# Patient Record
Sex: MALE | Race: WHITE | NOT HISPANIC OR LATINO | Employment: UNEMPLOYED | URBAN - METROPOLITAN AREA
[De-identification: names, ages, dates, MRNs, and addresses within clinical notes are randomized per-mention and may not be internally consistent; named-entity substitution may affect disease eponyms.]

---

## 2023-07-21 ENCOUNTER — OFFICE VISIT (OUTPATIENT)
Dept: PRIMARY CARE | Facility: CLINIC | Age: 43
End: 2023-07-21
Payer: COMMERCIAL

## 2023-07-21 VITALS
SYSTOLIC BLOOD PRESSURE: 130 MMHG | BODY MASS INDEX: 31.86 KG/M2 | DIASTOLIC BLOOD PRESSURE: 86 MMHG | RESPIRATION RATE: 16 BRPM | TEMPERATURE: 98.1 F | HEART RATE: 83 BPM | WEIGHT: 238.2 LBS | OXYGEN SATURATION: 98 %

## 2023-07-21 DIAGNOSIS — N13.8 BPH WITH OBSTRUCTION/LOWER URINARY TRACT SYMPTOMS: ICD-10-CM

## 2023-07-21 DIAGNOSIS — Z89.421 RIGHT TOE AMPUTEE (MULTI): ICD-10-CM

## 2023-07-21 DIAGNOSIS — E78.5 HYPERLIPIDEMIA, UNSPECIFIED HYPERLIPIDEMIA TYPE: ICD-10-CM

## 2023-07-21 DIAGNOSIS — G54.6 PHANTOM LIMB SYNDROME WITH PAIN (MULTI): ICD-10-CM

## 2023-07-21 DIAGNOSIS — R53.83 OTHER FATIGUE: ICD-10-CM

## 2023-07-21 DIAGNOSIS — E11.21 TYPE 2 DIABETES MELLITUS WITH DIABETIC NEPHROPATHY, WITHOUT LONG-TERM CURRENT USE OF INSULIN (MULTI): Primary | ICD-10-CM

## 2023-07-21 DIAGNOSIS — M14.671 CHARCOT'S JOINT OF RIGHT ANKLE: ICD-10-CM

## 2023-07-21 DIAGNOSIS — N40.1 BPH WITH OBSTRUCTION/LOWER URINARY TRACT SYMPTOMS: ICD-10-CM

## 2023-07-21 DIAGNOSIS — F32.5 MAJOR DEPRESSION IN REMISSION (CMS-HCC): ICD-10-CM

## 2023-07-21 PROBLEM — E66.9 OBESITY, CLASS I, BMI 30-34.9: Status: ACTIVE | Noted: 2023-07-21

## 2023-07-21 PROBLEM — E66.811 OBESITY, CLASS I, BMI 30-34.9: Status: ACTIVE | Noted: 2023-07-21

## 2023-07-21 PROBLEM — Z00.00 WELL ADULT EXAM: Status: ACTIVE | Noted: 2023-07-21

## 2023-07-21 PROBLEM — R33.9 URINARY RETENTION: Status: ACTIVE | Noted: 2023-07-21

## 2023-07-21 PROBLEM — N52.9 ED (ERECTILE DYSFUNCTION): Status: ACTIVE | Noted: 2023-07-21

## 2023-07-21 PROCEDURE — 99214 OFFICE O/P EST MOD 30 MIN: CPT | Performed by: FAMILY MEDICINE

## 2023-07-21 PROCEDURE — 3075F SYST BP GE 130 - 139MM HG: CPT | Performed by: FAMILY MEDICINE

## 2023-07-21 PROCEDURE — 82607 VITAMIN B-12: CPT

## 2023-07-21 PROCEDURE — 83036 HEMOGLOBIN GLYCOSYLATED A1C: CPT

## 2023-07-21 PROCEDURE — 82306 VITAMIN D 25 HYDROXY: CPT

## 2023-07-21 PROCEDURE — 85027 COMPLETE CBC AUTOMATED: CPT

## 2023-07-21 PROCEDURE — 80061 LIPID PANEL: CPT

## 2023-07-21 PROCEDURE — 84403 ASSAY OF TOTAL TESTOSTERONE: CPT

## 2023-07-21 PROCEDURE — 80053 COMPREHEN METABOLIC PANEL: CPT

## 2023-07-21 PROCEDURE — 3066F NEPHROPATHY DOC TX: CPT | Performed by: FAMILY MEDICINE

## 2023-07-21 PROCEDURE — 3079F DIAST BP 80-89 MM HG: CPT | Performed by: FAMILY MEDICINE

## 2023-07-21 PROCEDURE — 1036F TOBACCO NON-USER: CPT | Performed by: FAMILY MEDICINE

## 2023-07-21 RX ORDER — BLOOD-GLUCOSE METER
EACH MISCELLANEOUS
COMMUNITY
Start: 2020-05-15 | End: 2023-10-02 | Stop reason: ALTCHOICE

## 2023-07-21 RX ORDER — TADALAFIL 20 MG/1
20 TABLET ORAL DAILY
COMMUNITY
Start: 2021-10-13 | End: 2023-10-02 | Stop reason: ALTCHOICE

## 2023-07-21 NOTE — PROGRESS NOTES
Ivonne Kenny PA-C     "Subjective   Patient ID: Nicanor White is a 42 y.o. male who presents for 6 month follow up.  Recheck on chronic stable DM, BPH with obstruction, Fatigue still low -- hx of low testosterone.     Dating [place] National -- Lynn -- travelled to [place] twice in past year!      Objective   Visit Vitals  /86 (BP Location: Left arm, Patient Position: Sitting, BP Cuff Size: Adult)   Pulse 83   Temp 36.7 °C (98.1 °F) (Temporal)   Resp 16   Wt 108 kg (238 lb 3.2 oz)   SpO2 98%   BMI 31.86 kg/m²   Smoking Status Never   BSA 2.35 m²      O: VSS AFEB Awake, Alert, NAD.  No intoxication, withdrawal, or sedation.  Chest CTA.  Heart RRR.  Ext no c/c/e.      Lab Results   Component Value Date    WBC 9.3 07/22/2022    HGB 14.6 07/22/2022    HCT 47.3 07/22/2022    MCV 93 07/22/2022     07/22/2022       Chemistry    Lab Results   Component Value Date/Time     07/22/2022 1407    K 4.4 07/22/2022 1407     07/22/2022 1407    CO2 29 07/22/2022 1407    BUN 14 07/22/2022 1407    CREATININE 0.99 07/22/2022 1407    Lab Results   Component Value Date/Time    CALCIUM 9.3 07/22/2022 1407    ALKPHOS 152 (H) 07/22/2022 1407    AST 22 07/22/2022 1407    ALT 20 07/22/2022 1407    BILITOT 0.9 07/22/2022 1407          Lab Results   Component Value Date    CHOL 219 (H) 07/22/2022    CHOL 164 11/23/2020    CHOL 204 (H) 07/21/2020     Lab Results   Component Value Date    HDL 34.9 (A) 07/22/2022    HDL 42.8 11/23/2020    HDL 29.0 (A) 07/21/2020     No results found for: \"LDLCALC\"  Lab Results   Component Value Date    TRIG 159 (H) 11/23/2020    TRIG 125 07/21/2020    TRIG 87 03/31/2020     No components found for: \"CHOLHDL\"   Lab Results   Component Value Date    HGBA1C 5.1 07/22/2022       Assessment/Plan   Problem List Items Addressed This Visit       BPH with obstruction/lower urinary tract symptoms    Overview     did NOT tolerate flomax and tadalafil  Seeing URO Dr Reza in [place].         Charcot's joint of right ankle "    Overview     out of CROW boot.    Planning reconstructive surgery -- 8/15/23 -- DPM Dr. Lui.            Hyperlipidemia    Current Assessment & Plan     Monitor lipids.  Diet controlled.          Phantom limb syndrome with pain (CMS/HCC)    Overview     Cymbalta helped but made urine retention worse -- weaned off.          Right toe amputee (CMS/HCC)    Overview     Never needed to get fit for carbon fiber AFO.   Just getting shoe inserts.          Type 2 diabetes mellitus with diabetic nephropathy (CMS/HCC) - Primary    Overview     Type 2 Diabetes with nephropathy and neuropathy (dx 3/2020)  Excellent control A1c 5.7->5.4%   6/2021 -- off metformin, and glimepiride.          Current Assessment & Plan     diet controlled.  Monitor A1c.         Major depression in remission (CMS/HCC)    Overview      associated with anxiety triggered by acute illness    no worse since weaned off cymbalta.   Continue ongoing counseling.

## 2023-07-22 LAB
ALANINE AMINOTRANSFERASE (SGPT) (U/L) IN SER/PLAS: 38 U/L (ref 10–52)
ALBUMIN (G/DL) IN SER/PLAS: 4.8 G/DL (ref 3.4–5)
ALKALINE PHOSPHATASE (U/L) IN SER/PLAS: 119 U/L (ref 33–120)
ANION GAP IN SER/PLAS: 14 MMOL/L (ref 10–20)
ASPARTATE AMINOTRANSFERASE (SGOT) (U/L) IN SER/PLAS: 36 U/L (ref 9–39)
BILIRUBIN TOTAL (MG/DL) IN SER/PLAS: 1.1 MG/DL (ref 0–1.2)
CALCIDIOL (25 OH VITAMIN D3) (NG/ML) IN SER/PLAS: 40 NG/ML
CALCIUM (MG/DL) IN SER/PLAS: 10.3 MG/DL (ref 8.6–10.6)
CARBON DIOXIDE, TOTAL (MMOL/L) IN SER/PLAS: 31 MMOL/L (ref 21–32)
CHLORIDE (MMOL/L) IN SER/PLAS: 102 MMOL/L (ref 98–107)
CHOLESTEROL (MG/DL) IN SER/PLAS: 224 MG/DL (ref 0–199)
CHOLESTEROL IN HDL (MG/DL) IN SER/PLAS: 35.9 MG/DL
CHOLESTEROL/HDL RATIO: 6.2
COBALAMIN (VITAMIN B12) (PG/ML) IN SER/PLAS: 507 PG/ML (ref 211–911)
CREATININE (MG/DL) IN SER/PLAS: 0.79 MG/DL (ref 0.5–1.3)
ERYTHROCYTE DISTRIBUTION WIDTH (RATIO) BY AUTOMATED COUNT: 13.2 % (ref 11.5–14.5)
ERYTHROCYTE MEAN CORPUSCULAR HEMOGLOBIN CONCENTRATION (G/DL) BY AUTOMATED: 31.7 G/DL (ref 32–36)
ERYTHROCYTE MEAN CORPUSCULAR VOLUME (FL) BY AUTOMATED COUNT: 94 FL (ref 80–100)
ERYTHROCYTES (10*6/UL) IN BLOOD BY AUTOMATED COUNT: 5.45 X10E12/L (ref 4.5–5.9)
ESTIMATED AVERAGE GLUCOSE FOR HBA1C: 100 MG/DL
GFR MALE: >90 ML/MIN/1.73M2
GLUCOSE (MG/DL) IN SER/PLAS: 99 MG/DL (ref 74–99)
HEMATOCRIT (%) IN BLOOD BY AUTOMATED COUNT: 51.4 % (ref 41–52)
HEMOGLOBIN (G/DL) IN BLOOD: 16.3 G/DL (ref 13.5–17.5)
HEMOGLOBIN A1C/HEMOGLOBIN TOTAL IN BLOOD: 5.1 %
LDL: 146 MG/DL (ref 0–99)
LEUKOCYTES (10*3/UL) IN BLOOD BY AUTOMATED COUNT: 10.5 X10E9/L (ref 4.4–11.3)
NON HDL CHOLESTEROL: 188 MG/DL
NRBC (PER 100 WBCS) BY AUTOMATED COUNT: 0 /100 WBC (ref 0–0)
PLATELETS (10*3/UL) IN BLOOD AUTOMATED COUNT: 207 X10E9/L (ref 150–450)
POTASSIUM (MMOL/L) IN SER/PLAS: 4.8 MMOL/L (ref 3.5–5.3)
PROTEIN TOTAL: 7.6 G/DL (ref 6.4–8.2)
SODIUM (MMOL/L) IN SER/PLAS: 142 MMOL/L (ref 136–145)
TESTOSTERONE (NG/DL) IN SER/PLAS: 305 NG/DL (ref 240–1000)
TRIGLYCERIDE (MG/DL) IN SER/PLAS: 213 MG/DL (ref 0–149)
UREA NITROGEN (MG/DL) IN SER/PLAS: 13 MG/DL (ref 6–23)
VLDL: 43 MG/DL (ref 0–40)

## 2023-07-24 DIAGNOSIS — E78.5 HYPERLIPIDEMIA, UNSPECIFIED HYPERLIPIDEMIA TYPE: ICD-10-CM

## 2023-07-28 RX ORDER — ATORVASTATIN CALCIUM 40 MG/1
40 TABLET, FILM COATED ORAL DAILY
Qty: 30 TABLET | Refills: 11 | Status: SHIPPED | OUTPATIENT
Start: 2023-07-28 | End: 2023-10-02 | Stop reason: ALTCHOICE

## 2023-08-09 ENCOUNTER — HOSPITAL ENCOUNTER (OUTPATIENT)
Dept: DATA CONVERSION | Facility: HOSPITAL | Age: 43
Discharge: HOME | End: 2023-08-09

## 2023-08-09 DIAGNOSIS — M14.671 CHARCOT'S JOINT, RIGHT ANKLE AND FOOT: ICD-10-CM

## 2023-08-09 LAB
MRSA DNA SPEC QL NAA+PROBE: NEGATIVE
SPECIMEN SOURCE: NORMAL

## 2023-08-15 ENCOUNTER — HOSPITAL ENCOUNTER (OUTPATIENT)
Dept: DATA CONVERSION | Facility: HOSPITAL | Age: 43
Discharge: HOME | End: 2023-08-15

## 2023-08-15 DIAGNOSIS — Z89.431 ACQUIRED ABSENCE OF RIGHT FOOT (MULTI): ICD-10-CM

## 2023-08-15 DIAGNOSIS — M14.671 CHARCOT'S JOINT, RIGHT ANKLE AND FOOT: ICD-10-CM

## 2023-08-15 DIAGNOSIS — E11.610 TYPE 2 DIABETES MELLITUS WITH DIABETIC NEUROPATHIC ARTHROPATHY (MULTI): ICD-10-CM

## 2023-10-02 ENCOUNTER — APPOINTMENT (OUTPATIENT)
Dept: PREADMISSION TESTING | Facility: HOSPITAL | Age: 43
End: 2023-10-02
Payer: COMMERCIAL

## 2023-10-02 ENCOUNTER — ANESTHESIA EVENT (OUTPATIENT)
Dept: OPERATING ROOM | Facility: HOSPITAL | Age: 43
End: 2023-10-02
Payer: COMMERCIAL

## 2023-10-02 ENCOUNTER — PRE-ADMISSION TESTING (OUTPATIENT)
Dept: PREADMISSION TESTING | Facility: HOSPITAL | Age: 43
End: 2023-10-02
Payer: COMMERCIAL

## 2023-10-02 VITALS
HEIGHT: 73 IN | SYSTOLIC BLOOD PRESSURE: 133 MMHG | OXYGEN SATURATION: 100 % | DIASTOLIC BLOOD PRESSURE: 79 MMHG | BODY MASS INDEX: 32.47 KG/M2 | HEART RATE: 81 BPM | RESPIRATION RATE: 16 BRPM | WEIGHT: 245 LBS | TEMPERATURE: 96.6 F

## 2023-10-02 RX ORDER — ASPIRIN 81 MG/1
81 TABLET ORAL
COMMUNITY

## 2023-10-02 ASSESSMENT — ENCOUNTER SYMPTOMS
GASTROINTESTINAL NEGATIVE: 1
RESPIRATORY NEGATIVE: 1
EYES NEGATIVE: 1
CONSTITUTIONAL NEGATIVE: 1
NEUROLOGICAL NEGATIVE: 1
ARTHRALGIAS: 1
CARDIOVASCULAR NEGATIVE: 1

## 2023-10-02 ASSESSMENT — CHADS2 SCORE
AGE GREATER THAN OR EQUAL TO 75: NO
PRIOR STROKE OR TIA OR THROMBOEMBOLISM: NO
HYPERTENSION: NO
AGE GREATER THAN OR EQUAL TO 75: NO
CHADS2 SCORE: 1
DIABETES: YES
CHF: NO

## 2023-10-02 ASSESSMENT — PAIN - FUNCTIONAL ASSESSMENT: PAIN_FUNCTIONAL_ASSESSMENT: 0-10

## 2023-10-02 ASSESSMENT — PAIN DESCRIPTION - DESCRIPTORS: DESCRIPTORS: DISCOMFORT;DULL

## 2023-10-02 ASSESSMENT — PAIN SCALES - GENERAL: PAINLEVEL_OUTOF10: 3

## 2023-10-02 NOTE — PREPROCEDURE INSTRUCTIONS
PAT DISCHARGE INSTRUCTIONS    Please call the Same Day Surgery (SDS) Department of the hospital where your procedure will be performed after 2:00 PM the day before your surgery. If you are scheduled on a Monday, or a Tuesday following a Monday holiday, you will need to call on the last business day prior to your surgery.    [facility]  [id number] [place] [address], [address]  [phone]    [address] [address] [phone]    [facility]  [address]  [place] [id number]  [phone]    Please let your surgeon know if:      You develop any open sores, shingles, burning or painful urination as these may increase your risk of an infection.   You no longer wish to have the surgery.   Any other personal circumstances change that may lead to the need to cancel or defer this surgery-such as being sick or getting admitted to any hospital within one week of your planned procedure.    Your contact details change, such as a change of address or phone number.    Starting now:     Please DO NOT drink alcohol or smoke for 24 hours before surgery. It is well known that quitting smoking can make a huge difference to your health and recovery from surgery. The longer you abstain from smoking, the better your chances of a healthy recovery. If you need help with quitting, call 4-800QUIT-NOW to be connected to a trained counselor who will discuss the best methods to help you quit.     Before your surgery:    Please stop all supplements 7 days prior to surgery. Or as directed by your surgeon.   Please stop taking NSAID pain medicine such as Advil and Motrin 5 days before surgery.    If you develop any fever, cough, cold, rashes, cuts, scratches, scrapes, urinary symptoms or infection anywhere on your body (including teeth and gums) prior to surgery, please call your surgeon’s office as soon as possible. This may require treatment to reduce the chance of  cancellation on the day of surgery.    The day before your surgery:   DIET- Do not eat any solid food or drink anything after midnight the night before surgery. This includes gum, mints, hard candy and coffee.    Get a good night’s rest. Use the special soap for bathing if you have been instructed to use one.    Arrival time is typically 2 hours prior to the time of surgery.    Scheduled surgery times may change and you will be notified if this occurs - please check your personal voicemail for any updates.     On the morning of surgery:   Wear comfortable, loose fitting clothes which open in the front. Please do not wear moisturizers, creams, lotions, makeup or perfume.    Please bring with you to surgery:   Photo ID and insurance card   Current list of medicines and allergies   Pacemaker/ Defibrillator/Heart stent cards   CPAP machine and mask    Slings/ splints/ crutches   A copy of your complete advanced directive/DHPOA.    Please do NOT bring with you to surgery:   All jewelry and valuables should be left at home.   Prosthetic devices such as contact lenses, hearing aids, dentures, eyelash extensions, hairpins and body piercings must be removed prior to going in to the surgical suite.    After outpatient surgery:   A responsible adult MUST accompany you at the time of discharge and stay with you for 24 hours after your surgery. You may NOT drive yourself home after surgery.    Do not drive, operate machinery, make critical decisions or do activities that require co-ordination or balance until after a night’s sleep.   Do not drink alcoholic beverages for 24 hours.   Instructions for resuming your medications will be provided by your surgeon.    CALL YOUR DOCTOR AFTER SURGERY IF YOU HAVE:     Chills and/or a fever of 101° F or higher.    Redness, swelling, pus or drainage from your surgical wound or a bad smell from the wound.    Lightheadedness, fainting or confusion.    Persistent vomiting (throwing up) and are  not able to eat or drink for 12 hours.    Three or more loose, watery bowel movements in 24 hours (diarrhea).   Difficulty or pain while urinating( after non-urological surgery)    Pain and swelling in your legs, especially if it is only on one side.    Difficulty breathing or are breathing faster than normal.    Any new concerning symptoms.

## 2023-10-02 NOTE — CPM/PAT H&P
"CPM/PAT Evaluation       Name: Nicanor White (Nicanor White)  /Age: 1980/43 y.o.     In-Person       Chief Complaint: \"I'm having my hardware removed\"    HPI:    The patient is a 43 year old male with a history of Charcot foot.  In  he underwent right tibiotalocalcaneal surgery with application of external fixator.  He has done very well post operatively.  He has some discomfort surrounding the hardware, but no drainage or bleeding.  He has been followed by Dr. Ernst and removal of the external fixator is recommended at this time.      Past Medical History:   Diagnosis Date    Cellulitis of unspecified part of limb 05/15/2020    Cellulitis of foot    Other acute postprocedural pain 2020    Post-operative pain    Personal history of other infectious and parasitic diseases 2020    History of herpes zoster    Personal history of other specified conditions 2020    History of fatigue       Past Surgical History:   Procedure Laterality Date    EXTERNAL FIXATOR APPLICATION Right     lower extremity    OTHER SURGICAL HISTORY  2020    Toe amputation       Patient  reports being sexually active and has had partner(s) who are female.        Allergies   Allergen Reactions    Rondec (Brompheniramine) [Brompheniramine-Pseudoephedrin] Unknown       Prior to Admission medications    Medication Sig Start Date End Date Taking? Authorizing Provider   aspirin 81 mg EC tablet Take 1 tablet (81 mg) by mouth twice a day. PT STOPPED     Historical Provider, MD   atorvastatin (Lipitor) 40 mg tablet Take 1 tablet (40 mg) by mouth once daily. 7/28/23 10/2/23  Dylan Mckeon MD   blood sugar diagnostic (OneTouch Verio test strips) strip once daily. 5/15/20 10/2/23  Historical Provider, MD   tadalafil 20 mg tablet Take 1 tablet (20 mg) by mouth once daily. 10/13/21 10/2/23  Historical Provider, MD      Review of Systems   Constitutional: Negative.    HENT: Negative.     Eyes: Negative. " "   Respiratory: Negative.     Cardiovascular: Negative.    Gastrointestinal: Negative.    Genitourinary: Negative.    Musculoskeletal:  Positive for arthralgias.   Neurological: Negative.      Anesthesia complications:  post operative nausea and vomiting.    /79 (BP Location: Right arm, Patient Position: Sitting)   Pulse 81   Temp 35.9 °C (96.6 °F) (Temporal)   Resp 16   Ht 1.854 m (6' 1\")   Wt 111 kg (245 lb)   SpO2 100%   BMI 32.32 kg/m²       Physical Exam  Vitals reviewed.   Constitutional:       Appearance: Normal appearance.   HENT:      Head: Normocephalic and atraumatic.      Mouth/Throat:      Mouth: Mucous membranes are moist.      Pharynx: Oropharynx is clear.   Eyes:      Extraocular Movements: Extraocular movements intact.      Pupils: Pupils are equal, round, and reactive to light.      Comments: Glasses.   Cardiovascular:      Rate and Rhythm: Normal rate and regular rhythm.   Pulmonary:      Breath sounds: Normal breath sounds.   Abdominal:      General: Bowel sounds are normal.      Palpations: Abdomen is soft.   Musculoskeletal:      Comments: External fixator in place right lower extremity.   Skin:     General: Skin is warm and dry.   Neurological:      Mental Status: He is alert and oriented to person, place, and time.          Assessment and Plan:      Charot's joint right ankle and foot:  Right external fixator removal tibiotalocalcaneal arthrodesis right, arthrodesis medial column right, right foot harvest & applications bone marrow  Diabetes mellitus - diet controlled only  Revised Cardiac Risk Index:  0  CHADS2:  1      Mary Alatorre PA-C      "

## 2023-10-03 ENCOUNTER — APPOINTMENT (OUTPATIENT)
Dept: PREADMISSION TESTING | Facility: HOSPITAL | Age: 43
End: 2023-10-03
Payer: COMMERCIAL

## 2023-10-03 ENCOUNTER — APPOINTMENT (OUTPATIENT)
Dept: RADIOLOGY | Facility: HOSPITAL | Age: 43
End: 2023-10-03
Payer: COMMERCIAL

## 2023-10-03 ENCOUNTER — HOSPITAL ENCOUNTER (OUTPATIENT)
Facility: HOSPITAL | Age: 43
Setting detail: OUTPATIENT SURGERY
Discharge: HOME | End: 2023-10-03
Attending: PODIATRIST | Admitting: PODIATRIST
Payer: COMMERCIAL

## 2023-10-03 ENCOUNTER — PHARMACY VISIT (OUTPATIENT)
Dept: PHARMACY | Facility: CLINIC | Age: 43
End: 2023-10-03
Payer: MEDICAID

## 2023-10-03 ENCOUNTER — ANESTHESIA (OUTPATIENT)
Dept: OPERATING ROOM | Facility: HOSPITAL | Age: 43
End: 2023-10-03
Payer: COMMERCIAL

## 2023-10-03 VITALS
HEART RATE: 75 BPM | WEIGHT: 245 LBS | RESPIRATION RATE: 16 BRPM | BODY MASS INDEX: 32.47 KG/M2 | SYSTOLIC BLOOD PRESSURE: 123 MMHG | HEIGHT: 73 IN | OXYGEN SATURATION: 97 % | TEMPERATURE: 95.2 F | DIASTOLIC BLOOD PRESSURE: 67 MMHG

## 2023-10-03 DIAGNOSIS — Z01.818 PREOPERATIVE TESTING: Primary | ICD-10-CM

## 2023-10-03 DIAGNOSIS — M14.671 CHARCOT'S JOINT OF RIGHT ANKLE: ICD-10-CM

## 2023-10-03 PROCEDURE — 2720000007 HC OR 272 NO HCPCS: Performed by: PODIATRIST

## 2023-10-03 PROCEDURE — 2500000005 HC RX 250 GENERAL PHARMACY W/O HCPCS: Performed by: ANESTHESIOLOGIST ASSISTANT

## 2023-10-03 PROCEDURE — C1713 ANCHOR/SCREW BN/BN,TIS/BN: HCPCS | Performed by: PODIATRIST

## 2023-10-03 PROCEDURE — RXMED WILLOW AMBULATORY MEDICATION CHARGE

## 2023-10-03 PROCEDURE — 2780000003 HC OR 278 NO HCPCS: Performed by: PODIATRIST

## 2023-10-03 PROCEDURE — 7100000010 HC PHASE TWO TIME - EACH INCREMENTAL 1 MINUTE: Performed by: PODIATRIST

## 2023-10-03 PROCEDURE — 64445 NJX AA&/STRD SCIATIC NRV IMG: CPT | Performed by: ANESTHESIOLOGY

## 2023-10-03 PROCEDURE — 7100000002 HC RECOVERY ROOM TIME - EACH INCREMENTAL 1 MINUTE: Performed by: PODIATRIST

## 2023-10-03 PROCEDURE — 3700000002 HC GENERAL ANESTHESIA TIME - EACH INCREMENTAL 1 MINUTE: Performed by: PODIATRIST

## 2023-10-03 PROCEDURE — 76000 FLUOROSCOPY <1 HR PHYS/QHP: CPT

## 2023-10-03 PROCEDURE — 7100000001 HC RECOVERY ROOM TIME - INITIAL BASE CHARGE: Performed by: PODIATRIST

## 2023-10-03 PROCEDURE — 7100000009 HC PHASE TWO TIME - INITIAL BASE CHARGE: Performed by: PODIATRIST

## 2023-10-03 PROCEDURE — C1889 IMPLANT/INSERT DEVICE, NOC: HCPCS | Performed by: PODIATRIST

## 2023-10-03 PROCEDURE — A27870 PR ARTHRODESIS,ANKLE,OPEN: Performed by: ANESTHESIOLOGY

## 2023-10-03 PROCEDURE — A27870 PR ARTHRODESIS,ANKLE,OPEN: Performed by: ANESTHESIOLOGIST ASSISTANT

## 2023-10-03 PROCEDURE — 2580000001 HC RX 258 IV SOLUTIONS: Performed by: ANESTHESIOLOGY

## 2023-10-03 PROCEDURE — 2500000004 HC RX 250 GENERAL PHARMACY W/ HCPCS (ALT 636 FOR OP/ED): Performed by: ANESTHESIOLOGIST ASSISTANT

## 2023-10-03 PROCEDURE — 3600000009 HC OR TIME - EACH INCREMENTAL 1 MINUTE - PROCEDURE LEVEL FOUR: Performed by: PODIATRIST

## 2023-10-03 PROCEDURE — 3700000001 HC GENERAL ANESTHESIA TIME - INITIAL BASE CHARGE: Performed by: PODIATRIST

## 2023-10-03 PROCEDURE — 2500000004 HC RX 250 GENERAL PHARMACY W/ HCPCS (ALT 636 FOR OP/ED): Performed by: PODIATRIST

## 2023-10-03 PROCEDURE — 3600000004 HC OR TIME - INITIAL BASE CHARGE - PROCEDURE LEVEL FOUR: Performed by: PODIATRIST

## 2023-10-03 PROCEDURE — 2500000004 HC RX 250 GENERAL PHARMACY W/ HCPCS (ALT 636 FOR OP/ED): Performed by: ANESTHESIOLOGY

## 2023-10-03 DEVICE — GUIDE WIRE, BALL-TIPPED, STERILE: Type: IMPLANTABLE DEVICE | Site: HEEL | Status: FUNCTIONAL

## 2023-10-03 DEVICE — COMPRESSION SCREW CANNULATED
Type: IMPLANTABLE DEVICE | Site: FOOT | Status: FUNCTIONAL
Brand: T2

## 2023-10-03 DEVICE — LOCKING SCREW, PARTIALLY THREADED: Type: IMPLANTABLE DEVICE | Site: FOOT | Status: FUNCTIONAL

## 2023-10-03 DEVICE — IMPLANTABLE DEVICE: Type: IMPLANTABLE DEVICE | Site: HEEL | Status: FUNCTIONAL

## 2023-10-03 DEVICE — LOCKING SCREW, FULLY THREADED: Type: IMPLANTABLE DEVICE | Site: FOOT | Status: FUNCTIONAL

## 2023-10-03 RX ORDER — OXYCODONE HYDROCHLORIDE 5 MG/1
5 TABLET ORAL EVERY 6 HOURS PRN
Qty: 28 TABLET | Refills: 0 | Status: SHIPPED | OUTPATIENT
Start: 2023-10-03 | End: 2023-10-10

## 2023-10-03 RX ORDER — LIDOCAINE HYDROCHLORIDE 10 MG/ML
INJECTION INFILTRATION; PERINEURAL AS NEEDED
Status: DISCONTINUED | OUTPATIENT
Start: 2023-10-03 | End: 2023-10-03

## 2023-10-03 RX ORDER — PROPOFOL 10 MG/ML
INJECTION, EMULSION INTRAVENOUS AS NEEDED
Status: DISCONTINUED | OUTPATIENT
Start: 2023-10-03 | End: 2023-10-03

## 2023-10-03 RX ORDER — ONDANSETRON HYDROCHLORIDE 2 MG/ML
4 INJECTION, SOLUTION INTRAVENOUS ONCE AS NEEDED
Status: DISCONTINUED | OUTPATIENT
Start: 2023-10-03 | End: 2023-10-03 | Stop reason: HOSPADM

## 2023-10-03 RX ORDER — KETOROLAC TROMETHAMINE 30 MG/ML
INJECTION, SOLUTION INTRAMUSCULAR; INTRAVENOUS AS NEEDED
Status: DISCONTINUED | OUTPATIENT
Start: 2023-10-03 | End: 2023-10-03

## 2023-10-03 RX ORDER — SODIUM CHLORIDE, SODIUM LACTATE, POTASSIUM CHLORIDE, CALCIUM CHLORIDE 600; 310; 30; 20 MG/100ML; MG/100ML; MG/100ML; MG/100ML
50 INJECTION, SOLUTION INTRAVENOUS CONTINUOUS
Status: DISCONTINUED | OUTPATIENT
Start: 2023-10-03 | End: 2023-10-03 | Stop reason: HOSPADM

## 2023-10-03 RX ORDER — MIDAZOLAM HYDROCHLORIDE 1 MG/ML
2 INJECTION INTRAMUSCULAR; INTRAVENOUS ONCE
Status: COMPLETED | OUTPATIENT
Start: 2023-10-03 | End: 2023-10-03

## 2023-10-03 RX ORDER — SODIUM CHLORIDE 9 MG/ML
100 INJECTION, SOLUTION INTRAVENOUS CONTINUOUS
Status: DISCONTINUED | OUTPATIENT
Start: 2023-10-03 | End: 2023-10-03

## 2023-10-03 RX ORDER — ONDANSETRON HYDROCHLORIDE 2 MG/ML
INJECTION, SOLUTION INTRAVENOUS AS NEEDED
Status: DISCONTINUED | OUTPATIENT
Start: 2023-10-03 | End: 2023-10-03

## 2023-10-03 RX ORDER — CEFAZOLIN SODIUM 2 G/100ML
2 INJECTION, SOLUTION INTRAVENOUS ONCE
Status: COMPLETED | OUTPATIENT
Start: 2023-10-03 | End: 2023-10-03

## 2023-10-03 RX ORDER — SODIUM CHLORIDE, SODIUM LACTATE, POTASSIUM CHLORIDE, CALCIUM CHLORIDE 600; 310; 30; 20 MG/100ML; MG/100ML; MG/100ML; MG/100ML
100 INJECTION, SOLUTION INTRAVENOUS CONTINUOUS
Status: DISCONTINUED | OUTPATIENT
Start: 2023-10-03 | End: 2023-10-03 | Stop reason: HOSPADM

## 2023-10-03 RX ORDER — SCOLOPAMINE TRANSDERMAL SYSTEM 1 MG/1
1 PATCH, EXTENDED RELEASE TRANSDERMAL ONCE
Status: DISCONTINUED | OUTPATIENT
Start: 2023-10-03 | End: 2023-10-03 | Stop reason: HOSPADM

## 2023-10-03 RX ORDER — FENTANYL CITRATE 50 UG/ML
INJECTION, SOLUTION INTRAMUSCULAR; INTRAVENOUS AS NEEDED
Status: DISCONTINUED | OUTPATIENT
Start: 2023-10-03 | End: 2023-10-03

## 2023-10-03 RX ORDER — GLYCOPYRROLATE 0.2 MG/ML
INJECTION INTRAMUSCULAR; INTRAVENOUS AS NEEDED
Status: DISCONTINUED | OUTPATIENT
Start: 2023-10-03 | End: 2023-10-03

## 2023-10-03 RX ADMIN — ONDANSETRON HYDROCHLORIDE 4 MG: 2 INJECTION INTRAMUSCULAR; INTRAVENOUS at 08:38

## 2023-10-03 RX ADMIN — KETOROLAC TROMETHAMINE 30 MG: 30 INJECTION, SOLUTION INTRAMUSCULAR; INTRAVENOUS at 10:22

## 2023-10-03 RX ADMIN — FENTANYL CITRATE 100 MCG: 50 INJECTION, SOLUTION INTRAMUSCULAR; INTRAVENOUS at 07:55

## 2023-10-03 RX ADMIN — CEFAZOLIN SODIUM 2 G: 2 INJECTION, SOLUTION INTRAVENOUS at 08:20

## 2023-10-03 RX ADMIN — PROPOFOL 200 MG: 10 INJECTION, EMULSION INTRAVENOUS at 08:21

## 2023-10-03 RX ADMIN — FENTANYL CITRATE 50 MCG: 0.05 INJECTION, SOLUTION INTRAMUSCULAR; INTRAVENOUS at 10:25

## 2023-10-03 RX ADMIN — DEXAMETHASONE SODIUM PHOSPHATE 8 MG: 4 INJECTION, SOLUTION INTRAMUSCULAR; INTRAVENOUS at 08:38

## 2023-10-03 RX ADMIN — MIDAZOLAM HYDROCHLORIDE 2 MG: 1 INJECTION, SOLUTION INTRAMUSCULAR; INTRAVENOUS at 07:55

## 2023-10-03 RX ADMIN — LIDOCAINE HYDROCHLORIDE 5 ML: 10 INJECTION, SOLUTION INFILTRATION; PERINEURAL at 08:21

## 2023-10-03 RX ADMIN — SODIUM CHLORIDE, POTASSIUM CHLORIDE, SODIUM LACTATE AND CALCIUM CHLORIDE: 600; 310; 30; 20 INJECTION, SOLUTION INTRAVENOUS at 09:18

## 2023-10-03 RX ADMIN — GLYCOPYRROLATE 0.2 MG: 0.2 INJECTION INTRAMUSCULAR; INTRAVENOUS at 08:54

## 2023-10-03 RX ADMIN — SODIUM CHLORIDE, POTASSIUM CHLORIDE, SODIUM LACTATE AND CALCIUM CHLORIDE: 600; 310; 30; 20 INJECTION, SOLUTION INTRAVENOUS at 08:15

## 2023-10-03 RX ADMIN — SCOLOPAMINE TRANSDERMAL SYSTEM 1 PATCH: 1 PATCH, EXTENDED RELEASE TRANSDERMAL at 07:30

## 2023-10-03 ASSESSMENT — PAIN - FUNCTIONAL ASSESSMENT
PAIN_FUNCTIONAL_ASSESSMENT: FLACC (FACE, LEGS, ACTIVITY, CRY, CONSOLABILITY)
PAIN_FUNCTIONAL_ASSESSMENT: 0-10
PAIN_FUNCTIONAL_ASSESSMENT: FLACC (FACE, LEGS, ACTIVITY, CRY, CONSOLABILITY)
PAIN_FUNCTIONAL_ASSESSMENT: 0-10
PAIN_FUNCTIONAL_ASSESSMENT: FLACC (FACE, LEGS, ACTIVITY, CRY, CONSOLABILITY)
PAIN_FUNCTIONAL_ASSESSMENT: FLACC (FACE, LEGS, ACTIVITY, CRY, CONSOLABILITY)
PAIN_FUNCTIONAL_ASSESSMENT: 0-10
PAIN_FUNCTIONAL_ASSESSMENT: FLACC (FACE, LEGS, ACTIVITY, CRY, CONSOLABILITY)
PAIN_FUNCTIONAL_ASSESSMENT: 0-10
PAIN_FUNCTIONAL_ASSESSMENT: FLACC (FACE, LEGS, ACTIVITY, CRY, CONSOLABILITY)
PAIN_FUNCTIONAL_ASSESSMENT: 0-10
PAIN_FUNCTIONAL_ASSESSMENT: 0-10
PAIN_FUNCTIONAL_ASSESSMENT: FLACC (FACE, LEGS, ACTIVITY, CRY, CONSOLABILITY)
PAIN_FUNCTIONAL_ASSESSMENT: 0-10

## 2023-10-03 ASSESSMENT — PAIN SCALES - GENERAL

## 2023-10-03 ASSESSMENT — COLUMBIA-SUICIDE SEVERITY RATING SCALE - C-SSRS
1. IN THE PAST MONTH, HAVE YOU WISHED YOU WERE DEAD OR WISHED YOU COULD GO TO SLEEP AND NOT WAKE UP?: NO
6. HAVE YOU EVER DONE ANYTHING, STARTED TO DO ANYTHING, OR PREPARED TO DO ANYTHING TO END YOUR LIFE?: NO
2. HAVE YOU ACTUALLY HAD ANY THOUGHTS OF KILLING YOURSELF?: NO

## 2023-10-03 NOTE — PERIOPERATIVE NURSING NOTE
Bedside report given to ASHLY Schneider RN. Patient transferred to \[facility]\"" to prepare for discharge home.

## 2023-10-03 NOTE — POST-PROCEDURE NOTE
Patient sat up at the edge of the bed, denies dizziness. Family helping him get dressed. Nausea has subsided. Patient still denies pain. Dressing dry and intact.

## 2023-10-03 NOTE — ANESTHESIA PREPROCEDURE EVALUATION
Patient: Nicanor White    Procedure Information       Date/Time: 10/03/23 0715    Procedure: RIGHT EXTERNAL FIXATOR REMOVAL, TIBIOTALOCALCANEAL ARTHRODESIS RIGHT, ARTHRODESIS MEDIAL COLUMN RIGHT, RIGHT FOOT HARVEST & APPLICATION BONE MARROW AND PREPARATION (C-ARM, [employer] BMA/PRD/GRAFT-INFLUX, SAG SAW-ANDRES, LARGE CANNULATED SCREW, M NAIL - JERRY *PRE-OP BLOCK* (Right: Foot)    Location: [facility] / [facility]    Surgeons: Maliha Ernst DPM            Relevant Problems   Cardiovascular   (+) Hyperlipidemia      Endocrine   (+) Obesity, Class I, BMI 30-34.9   (+) Type 2 diabetes mellitus with diabetic nephropathy (CMS/HCC)      Neuro/Psych   (+) Major depression in remission (CMS/[facility])       Clinical information reviewed:   Tobacco  Allergies    Med Hx  Surg Hx   Fam Hx          NPO Detail:  No data recorded     Physical Exam    Airway  Mallampati: III  TM distance: >3 FB  Neck ROM: full     Cardiovascular    Dental    Pulmonary    Abdominal            Anesthesia Plan    ASA 3     general and other   (General with sciatic and saphenous (AC) nerve blocks.)

## 2023-10-03 NOTE — OP NOTE
RIGHT EXTERNAL FIXATOR REMOVAL, TIBIOTALOCALCANEAL ARTHRODESIS RIGHT, ARTHRODESIS MEDIAL COLUMN RIGHT, RIGHT FOOT HARVEST & APPLICATION BONE MARROW AND PREPARATION (C-ARM, [employer] BMA/PRD/GRAFT-INFLUX, SAG SAW-ANDRES, LARGE CANNULATED SCREW, M NAIL - JERRY *PRE-OP BLOCK* (R) Operative Note     Date: 10/3/2023  OR Location: [facility]    Name: Nicanor White, : 1980, Age: 43 y.o., MRN: [id number], Sex: male    Diagnosis  Pre-op Diagnosis     * Charcot's joint, right ankle and foot [M14.671] Post-op Diagnosis     * Charcot's joint, right ankle and foot [M14.671]     Procedures  Tibiotalar calcaneus arthrodesis with IM nail  Hindfoot beaming with talar  navicular arthrodesis  Autologous bone marrow harvest and application   External fixator removal    Surgeons      * Maliha Ernst - Primary    Resident/Fellow/Other Assistant:  No surgical staff documented.    Procedure Summary  Anesthesia: General  ASA: III  Anesthesia Staff: Anesthesiologist: Bubba Thomas MD  CRNA: Jazmine Randolph APRN-CRNA  C-AA: MUNDO Kingsley  Estimated Blood Loss: 20mL  Intra-op Medications:   Medication Name Total Dose   lactated Ringer's infusion 120.83 mL   scopolamine (Transderm-Scop) patch 1 patch 1 patch   ceFAZolin in dextrose (iso-os) (Ancef) IVPB 2 g 2 g   fentaNYL (Sublimaze) injection 100 mcg 100 mcg   midazolam (Versed) injection 2 mg 2 mg              Anesthesia Record               Intraprocedure I/O Totals          Intake    lactated Ringer's infusion 1200.00 mL    Autologous Blood 0 mL    Total Intake 1200 mL          Specimen: No specimens collected     Staff:   Circulator: Cecy Salmeron RN  Scrub Person: Yoli Olson         Drains and/or Catheters: * None in log *    Tourniquet Times:     89 minutes    Implants:  Implants       Type Name Action Serial No.       5MM X 115MM T2 LOCKING SCREW FULL THREAD Implanted      Screw LOCKING SCREW, T2 FULL THR 5MM X 35MM - [id number] Implanted       5MM X  32.5MM T2  LOCKING SCREW FULL THREAD Implanted       5MM X 50MM T2 SHAFT SCREW Implanted       5MM X 120MM T2 LOCKING SCREW FULL THREAD Implanted       5MM X 57.5MM T2  LOCKING SCREW FULL THREAD Implanted      Screw LOCKING SCREW, T2 FULL THR 5MM X 30MM - [id number] Implanted      Screw SCREW, ANKLE COMPRESSION, T2 - [id number] Implanted       12MM X 200MM, RIGHT, T2 ANKLE ARTHRODESIS NAIL Implanted      Screw GUIDE WIRE, RADHA, 3.0MM X 800MM - [id number] Implanted      Screw WIRE, NEEL 3 X 285 - [id number] Implanted      Screw WIRE, NEEL 3 X 285 - [id number] Implanted      Screw WIRE, NEEL 3 X 285 - [id number] Implanted       5.0MM X 70MM FIXOS HEADLESS COMPRESSION SCREW TITANIUM Implanted       3ML AUGMENT REGENERATIVE INJECTABLE Implanted       5CC INFLUX DEMINERALIZED CORTICAL BONE FIBER Implanted               Findings: see op report    Indications: Nicanor White is an 43 y.o. male who is having surgery for Charcot's joint, right ankle and foot [M14.671].  He underwent hindfoot reconstruction with application of an external fixator approximately 7 weeks ago.  This procedure his foot was relocated to be in line with the ankle and placed in a 90 degree position and the external fixator held in place.  He had shown good bone growth across his talotibial calcaneal interface.  Given that there is good bone growth the decision was made to remove the external fixator and do final fixation for his Charcot deformity.    The patient was seen in the preoperative area. The risks, benefits, complications, treatment options, non-operative alternatives, expected recovery and outcomes were discussed with the patient. The possibilities of reaction to medication, pulmonary aspiration, injury to surrounding structures, bleeding, recurrent infection, the need for additional procedures, failure to diagnose a condition, and creating a complication requiring transfusion or operation were discussed with the patient. The patient concurred  with the proposed plan, giving informed consent.  The site of surgery was properly noted/marked if necessary per policy. The patient has been actively warmed in preoperative area. Preoperative antibiotics have been ordered and given within 1 hours of incision. Venous thrombosis prophylaxis have been ordered including unilateral sequential compression device    Procedure Details: The patient was identified and the surgical site was marked, a preoperative regional block was performed.  The patient was then brought to the operating room and placed on the operating room table in the supine position.  Anesthesia then administered a general anesthesia.  The right lower extremity was then identified and a timeout was then performed properly identifying the patient and the surgical site and all other pertinent information.  After this a  was then used to release all the wires of the external fixator and the external fixator was then removed.  All wires were removed from with the patient's bone.  After this the right lower extremity was then scrubbed prepped and draped in the usual sterile fashion.   Attention was now directed to the anterior medial aspect of the tibial crest proximally where a Jamshidi trocar was then introduced and the bone marrow aspiration system was realized to aspirate 2 vials of bone marrow which were sent off the field to be spun down.  This incision was closed with 3-0 nylon in a simple interrupted fashion.  Once this was spun down it was added to 5 cc of influx DBM mixed with 3 cc of augment.    After this attention was then directed to the foot the foot was sitting in a rectus position fluoroscopy was then utilized to identify the foot was sitting in a 90 degree position with the second toe in line with the tibial tuberosity.  It was then made to begin with the intramedullary nail fixation.  The guidewire for the intramedullary nail was then introduced into the plantar aspect of the  calcaneus through the talar block into the tibia positioning was checked on fluoroscopy and was found to be in the central aspect of the calcaneus, talus and the tibial medullary canal in both the coronal and sagittal planes.  After this the appropriate reaming steps were then taken to be able to introduce a 12 mm 200 mm nail.  After all reaming was done the nail attached the jig was then inserted into the calcaneus through the tibia.  Its positioning was checked on fluoroscopy in multiple planes and was found to be in adequate position.  After this the nail was then fixated starting with the talar screw followed by the proximal tibial screws after this internal compression was then performed and approximately 3 mm of compression across the joint sites was then noted.  External compression was then applied as well.  After this the calcaneal screws were then implemented 1 first from lateral to medial.  Positioning of all the screws was then checked on fluoroscopy and found to be adequate after this the posterior to anterior screw was then inserted this 1 then across the calcaneocuboid joint as well.  At this point final radiographs were then taken of the intramedullary nail system in all positions were adequate as were the length of each of the screws.  The jig was then removed.    Now both the medial and lateral incision about the ankle joint and talonavicular joint were then made and carried down to the level of the bone there was a small deficit noted to the proximal aspect of the navicular face and the talar block interface.  Any fibrotic and nonviable tissue was then resected and sent off the field. this area was then copiously irrigated with normal saline.  Any bone graft with bone marrow aspirate and augment was then inserted feeling all deficits and voids.  After this a cannulated 5.0 partially-threaded screw was then placed from the medial aspect of the navicular across the talar block position and length  were checked to be adequate on fluoroscopy.  Another 5.0 solid fully threaded screw was then placed from the navicular through the talar block into the dorsal aspect of the calcaneus.  This was done in the standard AO fashion position and length were checked on fluoroscopy and found to be adequate.  Final radiographs were then taken in all hardware was intact, stable and in the appropriate position.  There is good apposition of all arthrodesis sites.  A layered closure was then performed consisting of 2-0, 3-0 vicryl and 3-0 and 2-0 nylon was performed in a simple interrupted and horizontal mattress type fashion.  A dressing of triple antibiotic ointment, Adaptic, gauze, Kerlix, ABD pads and a well-padded posterior splint was then applied.  The patient was then awoken and transferred to the postanesthesia care unit with all vital signs stable and neurovascular status intact as it was preoperatively.  It should be noted that all sponge needle instrument counts were deemed to be accurate at the end of the case.  Complications:  None; patient tolerated the procedure well.    Disposition: PACU - hemodynamically stable.  Condition: stable         Additional Details: none    Attending Attestation:     Maliha Ernst  Phone Number: [phone]

## 2023-10-03 NOTE — POST-PROCEDURE NOTE
Discharge instructions reviewed with patient and his family. All verbalize understanding. Patient has crutches and walker at home to help him get around. Meds from pharmacy received.

## 2023-10-03 NOTE — ANESTHESIA PROCEDURE NOTES
Peripheral Block    Patient location during procedure: post-op  Start time: 10/3/2023 7:57 AM  End time: 10/3/2023 8:07 AM  Reason for block: post-op pain management  Staffing  Performed: attending   Authorized by: Bubba Thomas MD    Performed by: Bubba Thomas MD  Preanesthetic Checklist  Completed: patient identified, IV checked, site marked, risks and benefits discussed, surgical consent, monitors and equipment checked, pre-op evaluation and timeout performed   Timeout performed at: 10/3/2023 7:54 AM  Peripheral Block  Patient position: laying flat  Prep: alcohol swabs, ChloraPrep and site prepped and draped  Patient monitoring: heart rate, cardiac monitor and continuous pulse ox  Block type: adductor canal and sciatic  Injection technique: single-shot  Guidance: nerve stimulator and ultrasound guided  Infiltration strength: 1 %  Dose: 3 mL  Needle  Needle gauge: 22 G  Needle length: 5 cm  Needle localization: ultrasound guidance     image stored in chart  Needle insertion depth: 5 cm  Test dose: negative  Assessment  Injection assessment: negative aspiration for heme, no paresthesia on injection, local visualized surrounding nerve on ultrasound and incremental injection  Paresthesia pain: immediately resolved  Heart rate change: no  Slow fractionated injection: no  Additional Notes  Meds used:  bupivacaine 0.5% x 20mL for sciatic nerve block PLUS bupivacaine 0.5% with dexamethasone 10mg x 20mL for AC block

## 2023-10-03 NOTE — ANESTHESIA PROCEDURE NOTES
Airway  Date/Time: 10/3/2023 8:24 AM  Urgency: elective    Airway not difficult    Staffing  Performed: CAA   Authorized by: Bubba Thomas MD    Performed by: MUNDO Kingsley  Patient location during procedure: OR    Indications and Patient Condition  Indications for airway management: anesthesia  Spontaneous ventilation: present  Sedation level: deep  Preoxygenated: yes  Patient position: sniffing  MILS not maintained throughout  Mask difficulty assessment: 0 - not attempted    Final Airway Details  Final airway type: supraglottic airway      Successful airway: classic  Size 5     Number of attempts at approach: 1

## 2023-10-03 NOTE — ANESTHESIA POSTPROCEDURE EVALUATION
Patient: Nicanor White    Procedure Summary       Date: 10/03/23 Room / Location: [facility] 02 / [facility]    Anesthesia Start: 0815 Anesthesia Stop: 1041    Procedure: RIGHT EXTERNAL FIXATOR REMOVAL, TIBIOTALOCALCANEAL ARTHRODESIS RIGHT, ARTHRODESIS MEDIAL COLUMN RIGHT, RIGHT FOOT HARVEST & APPLICATION BONE MARROW AND PREPARATION (C-ARM, [employer] BMA/PRD/GRAFT-INFLUX, SAG SAW-ANDRES, LARGE CANNULATED SCREW, M NAIL - JERRY *PRE-OP BLOCK* (Right: Foot) Diagnosis:       Charcot's joint, right ankle and foot      (Charcot's joint, right ankle and foot [M14.671])    Surgeons: Maliha Ernst DPM Responsible Provider: Bubba Thomas MD    Anesthesia Type: general, other ASA Status: 3            Anesthesia Type: general, other    Vitals Value Taken Time   /79 10/03/23 1140   Temp 36.1 °C (97 °F) 10/03/23 1140   Pulse 72 10/03/23 1140   Resp 13 10/03/23 1140   SpO2 96 % 10/03/23 1140       Anesthesia Post Evaluation    Patient location during evaluation: bedside  Patient participation: complete - patient participated  Level of consciousness: awake and alert  Pain management: satisfactory to patient  Multimodal analgesia pain management approach  Airway patency: patent  Cardiovascular status: acceptable and blood pressure returned to baseline  Respiratory status: acceptable  Hydration status: acceptable  Comments: Patient with some mild nausea post-op in phase 2.  At one point was about to get dressed but felt nauseated, and laid back down for approximately 30 min.  Felt better after time and asked to leave.        No notable events documented.

## 2023-10-03 NOTE — POST-PROCEDURE NOTE
Report received from RHONDA Hastings. Patient pulled to [facility], denies pain. Dressing to right lower extremity dry and intact. Patient given crackers and ginger ale, family called to room. Patient somewhat nauseated, will monitor.

## 2023-10-04 ENCOUNTER — APPOINTMENT (OUTPATIENT)
Dept: PREADMISSION TESTING | Facility: HOSPITAL | Age: 43
End: 2023-10-04
Payer: COMMERCIAL

## 2024-01-05 ENCOUNTER — LAB (OUTPATIENT)
Dept: LAB | Facility: LAB | Age: 44
End: 2024-01-05
Payer: COMMERCIAL

## 2024-01-05 ENCOUNTER — OFFICE VISIT (OUTPATIENT)
Dept: PRIMARY CARE | Facility: CLINIC | Age: 44
End: 2024-01-05
Payer: COMMERCIAL

## 2024-01-05 VITALS
TEMPERATURE: 97.6 F | DIASTOLIC BLOOD PRESSURE: 68 MMHG | RESPIRATION RATE: 16 BRPM | OXYGEN SATURATION: 98 % | HEART RATE: 86 BPM | BODY MASS INDEX: 32.59 KG/M2 | WEIGHT: 247 LBS | SYSTOLIC BLOOD PRESSURE: 110 MMHG

## 2024-01-05 DIAGNOSIS — E11.610 TYPE 2 DIABETES MELLITUS WITH DIABETIC NEUROPATHIC ARTHROPATHY, WITHOUT LONG-TERM CURRENT USE OF INSULIN (MULTI): ICD-10-CM

## 2024-01-05 DIAGNOSIS — M14.671 CHARCOT'S JOINT OF RIGHT ANKLE: ICD-10-CM

## 2024-01-05 DIAGNOSIS — G54.6 PHANTOM LIMB SYNDROME WITH PAIN (MULTI): ICD-10-CM

## 2024-01-05 DIAGNOSIS — E29.1 HYPOGONADISM MALE: ICD-10-CM

## 2024-01-05 DIAGNOSIS — Z79.899 DRUG THERAPY: ICD-10-CM

## 2024-01-05 DIAGNOSIS — F32.5 MAJOR DEPRESSION IN REMISSION (CMS-HCC): Primary | ICD-10-CM

## 2024-01-05 DIAGNOSIS — Z89.421 RIGHT TOE AMPUTEE (MULTI): ICD-10-CM

## 2024-01-05 DIAGNOSIS — Z12.5 SPECIAL SCREENING FOR MALIGNANT NEOPLASM OF PROSTATE: ICD-10-CM

## 2024-01-05 PROBLEM — E11.9 DIABETES MELLITUS (MULTI): Status: ACTIVE | Noted: 2024-01-05

## 2024-01-05 LAB
ALBUMIN SERPL BCP-MCNC: 4.4 G/DL (ref 3.4–5)
ALP SERPL-CCNC: 186 U/L (ref 33–120)
ALT SERPL W P-5'-P-CCNC: 32 U/L (ref 10–52)
ANION GAP SERPL CALC-SCNC: 15 MMOL/L (ref 10–20)
AST SERPL W P-5'-P-CCNC: 25 U/L (ref 9–39)
BILIRUB SERPL-MCNC: 1.2 MG/DL (ref 0–1.2)
BUN SERPL-MCNC: 14 MG/DL (ref 6–23)
CALCIUM SERPL-MCNC: 9.7 MG/DL (ref 8.6–10.3)
CHLORIDE SERPL-SCNC: 103 MMOL/L (ref 98–107)
CO2 SERPL-SCNC: 29 MMOL/L (ref 21–32)
CREAT SERPL-MCNC: 0.81 MG/DL (ref 0.5–1.3)
ERYTHROCYTE [DISTWIDTH] IN BLOOD BY AUTOMATED COUNT: 12.4 % (ref 11.5–14.5)
GFR SERPL CREATININE-BSD FRML MDRD: >90 ML/MIN/1.73M*2
GLUCOSE SERPL-MCNC: 84 MG/DL (ref 74–99)
HCT VFR BLD AUTO: 50.8 % (ref 41–52)
HGB BLD-MCNC: 15.9 G/DL (ref 13.5–17.5)
MCH RBC QN AUTO: 29.1 PG (ref 26–34)
MCHC RBC AUTO-ENTMCNC: 31.3 G/DL (ref 32–36)
MCV RBC AUTO: 93 FL (ref 80–100)
NRBC BLD-RTO: 0 /100 WBCS (ref 0–0)
PLATELET # BLD AUTO: 188 X10*3/UL (ref 150–450)
POTASSIUM SERPL-SCNC: 4.5 MMOL/L (ref 3.5–5.3)
PROT SERPL-MCNC: 7.2 G/DL (ref 6.4–8.2)
RBC # BLD AUTO: 5.46 X10*6/UL (ref 4.5–5.9)
SODIUM SERPL-SCNC: 142 MMOL/L (ref 136–145)
WBC # BLD AUTO: 9 X10*3/UL (ref 4.4–11.3)

## 2024-01-05 PROCEDURE — 3074F SYST BP LT 130 MM HG: CPT | Performed by: FAMILY MEDICINE

## 2024-01-05 PROCEDURE — 84403 ASSAY OF TOTAL TESTOSTERONE: CPT

## 2024-01-05 PROCEDURE — 80365 DRUG SCREENING OXYCODONE: CPT

## 2024-01-05 PROCEDURE — 83036 HEMOGLOBIN GLYCOSYLATED A1C: CPT

## 2024-01-05 PROCEDURE — 1036F TOBACCO NON-USER: CPT | Performed by: FAMILY MEDICINE

## 2024-01-05 PROCEDURE — 3078F DIAST BP <80 MM HG: CPT | Performed by: FAMILY MEDICINE

## 2024-01-05 PROCEDURE — 80354 DRUG SCREENING FENTANYL: CPT

## 2024-01-05 PROCEDURE — 85027 COMPLETE CBC AUTOMATED: CPT

## 2024-01-05 PROCEDURE — 80373 DRUG SCREENING TRAMADOL: CPT

## 2024-01-05 PROCEDURE — 99214 OFFICE O/P EST MOD 30 MIN: CPT | Performed by: FAMILY MEDICINE

## 2024-01-05 PROCEDURE — 84153 ASSAY OF PSA TOTAL: CPT

## 2024-01-05 PROCEDURE — 80368 SEDATIVE HYPNOTICS: CPT

## 2024-01-05 PROCEDURE — 3066F NEPHROPATHY DOC TX: CPT | Performed by: FAMILY MEDICINE

## 2024-01-05 PROCEDURE — 80361 OPIATES 1 OR MORE: CPT

## 2024-01-05 PROCEDURE — 80346 BENZODIAZEPINES1-12: CPT

## 2024-01-05 PROCEDURE — 36415 COLL VENOUS BLD VENIPUNCTURE: CPT

## 2024-01-05 PROCEDURE — 80358 DRUG SCREENING METHADONE: CPT

## 2024-01-05 PROCEDURE — 80053 COMPREHEN METABOLIC PANEL: CPT

## 2024-01-05 RX ORDER — TESTOSTERONE CYPIONATE 200 MG/ML
200 INJECTION, SOLUTION INTRAMUSCULAR
Qty: 3 ML | Refills: 1 | Status: SHIPPED | OUTPATIENT
Start: 2024-01-05 | End: 2024-01-24 | Stop reason: SDUPTHER

## 2024-01-05 NOTE — ASSESSMENT & PLAN NOTE
Recheck levels.  And monitor PSA/CBC.   Start 200 mg IM q4 weeks.  Recheck TEST/PSA/CBC in 3-4 months.

## 2024-01-05 NOTE — PROGRESS NOTES
Subjective   Patient ID: Nicanor White is a 43 y.o. male who presents for Follow-up (6 month).  Recheck on chronic stable DM, BPH with obstruction, Fatigue still low -- hx of low testosterone.     Dating [place] [employer] -- Lynn (Drew) -- travelled to [place] twice in past year!    S/P Right ankle/foot fusion summer 2023    Down another 10#  Continues to have low energy, ED, and low muscle mass despite rehabbing.     OARRS:  Dylan Mckeon MD on 1/5/2024  1:58 PM  I have personally reviewed the OARRS report for Nicanor White. I have considered the risks of abuse, dependence, addiction and diversion and I believe that it is clinically appropriate for Nicanor White to be prescribed this medication    Is the patient prescribed a combination of a benzodiazepine and opioid?  No    Last Urine Drug Screen / ordered today: Yes  No results found for this or any previous visit (from the past 8760 hour(s)).  N/A    Sent 1/5/24      Controlled Substance Agreement:  Date of the Last Agreement: 1/5/24  Reviewed Controlled Substance Agreement including but not limited to the benefits, risks, and alternatives to treatment with a Controlled Substance medication(s).    Testosterone:  What is the patient's goal of therapy? Tx of ED, energy, sarcopenia  Is this being achieved with current treatment? Just starting    I attest the patient does not have: Breast Cancer, Polycythemia, or Prostate Cancer    Last Testosterone check:  Testosterone   Date Value Ref Range Status   07/21/2023 305 240 - 1000 ng/dL Final     Comment:      Nandrolone decanoate, 11 Beta-hydroxytestosterone, androstenedione,   testosterone propionate and 11-keto-testosterone strongly cross    react with this test method.    Biotin interference may cause falsely elevated results. Patients   taking a Biotin dose of up to 5 mg/day should refrain from taking   Biotin for 24 hours before sample collection. Providers may contact   their local laboratory for  "further information.       Last CBC:    No results found for: \"CBCDIF\", \"BMCBC\", \"PR1\"    Last PSA:   PSA   Date Value Ref Range Status   06/18/2021 0.24 0.00 - 4.00 ng/mL Final     Comment:     The FDA requires that the method used for PSA assay be   reported to the physician. Values obtained with different   assay methods must not be used interchangeably. This test   was performed at [facility] using the Siemens  Axonify PSA method, which is a sandwich immunoassay using   chemiluminescence for quantitation. The assay is approved  for measurement of prostate-specific antigen (PSA) in   serum and may be used in conjunction with a digital rectal  examination in men 50 years and older as an aid in   detection of prostate cancer.   5-Alpha-reductase inhibitors (e.g. Proscar, Finasteride,   Avodart, Dutasteride and Ale) for the treatment of BPH   have been shown to lower PSA levels by an average of 50%   after 6 months of treatment.         Activities of Daily Living:   Is your overall impression that this patient is benefiting (symptom reduction outweighs side effects) from testosterone therapy? Yes     1. Physical Functioning: Better  2. Family Relationship: Same  3. Social Relationship: Same  4. Mood: Same  5. Sleep Patterns: Same  6. Overall Function: Better      Objective   Visit Vitals  /68 (BP Location: Left arm, Patient Position: Sitting, BP Cuff Size: Adult)   Pulse 86   Temp 36.4 °C (97.6 °F) (Temporal)   Resp 16   Wt 112 kg (247 lb)   SpO2 98%   BMI 32.59 kg/m²   Smoking Status Never   BSA 2.4 m²      O: VSS AFEB Awake, Alert, NAD.  No intoxication, withdrawal, or sedation.  Chest CTA.  Heart RRR.  Ext no c/c/e.   Right foot fused in brace.    Lab Results   Component Value Date    WBC 10.5 07/21/2023    HGB 16.3 07/21/2023    HCT 51.4 07/21/2023    MCV 94 07/21/2023     07/21/2023       Chemistry    Lab Results   Component Value Date/Time     07/21/2023 1404    K 4.8 " "07/21/2023 1404     07/21/2023 1404    CO2 31 07/21/2023 1404    BUN 13 07/21/2023 1404    CREATININE 0.79 07/21/2023 1404    Lab Results   Component Value Date/Time    CALCIUM 10.3 07/21/2023 1404    ALKPHOS 119 07/21/2023 1404    AST 36 07/21/2023 1404    ALT 38 07/21/2023 1404    BILITOT 1.1 07/21/2023 1404          Lab Results   Component Value Date    CHOL 224 (H) 07/21/2023    CHOL 219 (H) 07/22/2022    CHOL 164 11/23/2020     Lab Results   Component Value Date    HDL 35.9 (A) 07/21/2023    HDL 34.9 (A) 07/22/2022    HDL 42.8 11/23/2020     No results found for: \"LDLCALC\"  Lab Results   Component Value Date    TRIG 213 (H) 07/21/2023    TRIG 159 (H) 11/23/2020    TRIG 125 07/21/2020     No components found for: \"CHOLHDL\"   Lab Results   Component Value Date    HGBA1C 5.1 07/21/2023       Assessment/Plan   Problem List Items Addressed This Visit       Charcot's joint of right ankle    Overview     S/P reconstructive surgery\/fusion -- 8/15/23 -- DPM Dr. Lui.            Phantom limb syndrome with pain (CMS/HCC)    Overview     Cymbalta helped but made urine retention worse -- weaned off.          Right toe amputee (CMS/HCC)    Overview     Never needed to get fit for carbon fiber AFO.   Just getting shoe inserts.            Major depression in remission (CMS/HCC) - Primary    Overview      associated with anxiety triggered by acute illness    no worse since weaned off cymbalta.   Continue ongoing counseling.         Diabetes mellitus (CMS/HCC)    Overview     With Carcot foot -- s/p fusion right foot/ankle 2023.    A1c normalized with weight loss and exercise regimen.          Hypogonadism male    Current Assessment & Plan     Recheck levels.  And monitor PSA/CBC.   Start 200 mg IM q4 weeks.  Recheck TEST/PSA/CBC in 3-4 months.           Other Visit Diagnoses       Drug therapy        Special screening for malignant neoplasm of prostate                 "

## 2024-01-06 LAB
EST. AVERAGE GLUCOSE BLD GHB EST-MCNC: 91 MG/DL
HBA1C MFR BLD: 4.8 %
PSA SERPL-MCNC: 0.46 NG/ML
TESTOST SERPL-MCNC: 261 NG/DL (ref 240–1000)

## 2024-01-23 ENCOUNTER — APPOINTMENT (OUTPATIENT)
Dept: PRIMARY CARE | Facility: CLINIC | Age: 44
End: 2024-01-23
Payer: COMMERCIAL

## 2024-01-24 DIAGNOSIS — E29.1 HYPOGONADISM MALE: ICD-10-CM

## 2024-01-24 NOTE — TELEPHONE ENCOUNTER
Pt stated that he is going o [place] in Feb and is asking for a double x so he can take with him amd not miss doses he said the pharm needs  a vacation override.

## 2024-01-25 RX ORDER — TESTOSTERONE CYPIONATE 200 MG/ML
200 INJECTION, SOLUTION INTRAMUSCULAR
Qty: 3 ML | Refills: 1 | Status: SHIPPED | OUTPATIENT
Start: 2024-01-25 | End: 2024-04-26 | Stop reason: SDUPTHER

## 2024-02-16 DIAGNOSIS — E29.1 HYPOGONADISM MALE: Primary | ICD-10-CM

## 2024-04-26 DIAGNOSIS — E29.1 HYPOGONADISM MALE: ICD-10-CM

## 2024-04-26 RX ORDER — TESTOSTERONE CYPIONATE 200 MG/ML
200 INJECTION, SOLUTION INTRAMUSCULAR
Qty: 6 ML | Refills: 1 | Status: SHIPPED | OUTPATIENT
Start: 2024-04-26 | End: 2024-07-25

## 2024-07-08 ENCOUNTER — LAB (OUTPATIENT)
Dept: LAB | Facility: LAB | Age: 44
End: 2024-07-08
Payer: COMMERCIAL

## 2024-07-08 DIAGNOSIS — E29.1 HYPOGONADISM MALE: ICD-10-CM

## 2024-07-08 LAB
ALBUMIN SERPL BCP-MCNC: 3.9 G/DL (ref 3.4–5)
ALP SERPL-CCNC: 106 U/L (ref 33–120)
ALT SERPL W P-5'-P-CCNC: 27 U/L (ref 10–52)
ANION GAP SERPL CALC-SCNC: 11 MMOL/L (ref 10–20)
AST SERPL W P-5'-P-CCNC: 27 U/L (ref 9–39)
BILIRUB SERPL-MCNC: 0.9 MG/DL (ref 0–1.2)
BUN SERPL-MCNC: 12 MG/DL (ref 6–23)
CALCIUM SERPL-MCNC: 8.8 MG/DL (ref 8.6–10.3)
CHLORIDE SERPL-SCNC: 101 MMOL/L (ref 98–107)
CO2 SERPL-SCNC: 29 MMOL/L (ref 21–32)
CREAT SERPL-MCNC: 0.93 MG/DL (ref 0.5–1.3)
EGFRCR SERPLBLD CKD-EPI 2021: >90 ML/MIN/1.73M*2
GLUCOSE SERPL-MCNC: 95 MG/DL (ref 74–99)
POTASSIUM SERPL-SCNC: 4.3 MMOL/L (ref 3.5–5.3)
PROT SERPL-MCNC: 6.2 G/DL (ref 6.4–8.2)
SODIUM SERPL-SCNC: 137 MMOL/L (ref 136–145)
TESTOST SERPL-MCNC: 437 NG/DL (ref 240–1000)

## 2024-07-08 PROCEDURE — 80053 COMPREHEN METABOLIC PANEL: CPT

## 2024-07-08 PROCEDURE — 36415 COLL VENOUS BLD VENIPUNCTURE: CPT

## 2024-07-08 PROCEDURE — 84403 ASSAY OF TOTAL TESTOSTERONE: CPT

## 2024-07-09 ENCOUNTER — APPOINTMENT (OUTPATIENT)
Dept: PRIMARY CARE | Facility: CLINIC | Age: 44
End: 2024-07-09
Payer: COMMERCIAL

## 2024-07-09 VITALS
WEIGHT: 254 LBS | OXYGEN SATURATION: 98 % | HEART RATE: 78 BPM | DIASTOLIC BLOOD PRESSURE: 76 MMHG | RESPIRATION RATE: 16 BRPM | BODY MASS INDEX: 33.51 KG/M2 | SYSTOLIC BLOOD PRESSURE: 130 MMHG | TEMPERATURE: 97.6 F

## 2024-07-09 DIAGNOSIS — E11.610 TYPE 2 DIABETES MELLITUS WITH DIABETIC NEUROPATHIC ARTHROPATHY, WITHOUT LONG-TERM CURRENT USE OF INSULIN (MULTI): Primary | ICD-10-CM

## 2024-07-09 DIAGNOSIS — E29.1 HYPOGONADISM MALE: ICD-10-CM

## 2024-07-09 PROCEDURE — 99214 OFFICE O/P EST MOD 30 MIN: CPT | Performed by: FAMILY MEDICINE

## 2024-07-09 PROCEDURE — 3044F HG A1C LEVEL LT 7.0%: CPT | Performed by: FAMILY MEDICINE

## 2024-07-09 PROCEDURE — 3078F DIAST BP <80 MM HG: CPT | Performed by: FAMILY MEDICINE

## 2024-07-09 PROCEDURE — 3075F SYST BP GE 130 - 139MM HG: CPT | Performed by: FAMILY MEDICINE

## 2024-07-09 RX ORDER — SYRINGE, DISPOSABLE, 3 ML
SYRINGE, EMPTY DISPOSABLE MISCELLANEOUS
Qty: 13 EACH | Refills: 3 | Status: SHIPPED | OUTPATIENT
Start: 2024-07-09

## 2024-07-09 RX ORDER — TESTOSTERONE CYPIONATE 1000 MG/10ML
150 INJECTION, SOLUTION INTRAMUSCULAR
Qty: 10 ML | Refills: 3 | Status: SHIPPED | OUTPATIENT
Start: 2024-07-09 | End: 2024-07-10 | Stop reason: CLARIF

## 2024-07-09 NOTE — PROGRESS NOTES
Subjective   Patient ID: Nicanor White is a 43 y.o. male who presents for Follow-up (6 months).  Recheck on chronic stable DM, BPH with obstruction, and low testosterone.      [employer] spring 2024-- Lynn (Drew).    S/P Right ankle/foot fusion summer 2023    Gaining weight on increased testosterone and exercising in gym.  Crashing energy wise after 7-10 days.  No side effects.     OARRS:  Dylan Mckeon MD on 7/9/2024 11:35 AM  I have personally reviewed the OARRS report for Nicanor White. I have considered the risks of abuse, dependence, addiction and diversion and I believe that it is clinically appropriate for Nicanor White to be prescribed this medication    Is the patient prescribed a combination of a benzodiazepine and opioid?  No    Last Urine Drug Screen / ordered today: Yes  Recent Results (from the past 8760 hour(s))   Confirmation Opiate/Opioid/Benzo Prescription Compliance    Collection Time: 01/05/24  2:19 PM   Result Value Ref Range    Clonazepam <25 <25 ng/mL    7-Aminoclonazepam <25 <25 ng/mL    Alprazolam <25 <25 ng/mL    Alpha-Hydroxyalprazolam <25 <25 ng/mL    Midazolam <25 <25 ng/mL    Alpha-Hydroxymidazolam <25 <25 ng/mL    Chlordiazepoxide <25 <25 ng/mL    Diazepam <25 <25 ng/mL    Nordiazepam <25 <25 ng/mL    Temazepam <25 <25 ng/mL    Oxazepam <25 <25 ng/mL    Lorazepam <25 <25 ng/mL    Methadone <25 <25 ng/mL    EDDP <25 <25 ng/mL    6-Acetylmorphine <25 <25 ng/mL    Codeine <50 <50 ng/mL    Hydrocodone <25 <25 ng/mL    Hydromorphone <25 <25 ng/mL    Morphine  <50 <50 ng/mL    Norhydrocodone <25 <25 ng/mL    Noroxycodone <25 <25 ng/mL    Oxycodone <25 <25 ng/mL    Oxymorphone <25 <25 ng/mL    Fentanyl <2.5 <2.5 ng/mL    Norfentanyl <2.5 <2.5 ng/mL    Tramadol <50 <50 ng/mL    O-Desmethyltramadol <50 <50 ng/mL    Zolpidem <25 <25 ng/mL    Zolpidem Metabolite (ZCA) <25 <25 ng/mL     N/A    Sent 1/5/24      Controlled Substance Agreement:  Date of the Last Agreement:  "1/5/24  Reviewed Controlled Substance Agreement including but not limited to the benefits, risks, and alternatives to treatment with a Controlled Substance medication(s).    Testosterone:  What is the patient's goal of therapy? Tx of ED, energy, sarcopenia  Is this being achieved with current treatment? Just starting    I attest the patient does not have: Breast Cancer, Polycythemia, or Prostate Cancer    Last Testosterone check:  Testosterone   Date Value Ref Range Status   07/08/2024 437 240 - 1,000 ng/dL Final       Last CBC:    No results found for: \"CBCDIF\", \"BMCBC\", \"PR1\"    Last PSA:   Prostate Specific AG   Date Value Ref Range Status   01/05/2024 0.46 <=4.00 ng/mL Final       Activities of Daily Living:   Is your overall impression that this patient is benefiting (symptom reduction outweighs side effects) from testosterone therapy? Yes     1. Physical Functioning: Better  2. Family Relationship: Same  3. Social Relationship: Same  4. Mood: Same  5. Sleep Patterns: Same  6. Overall Function: Better      Objective   Visit Vitals  /76 (BP Location: Left arm, Patient Position: Sitting, BP Cuff Size: Adult)   Pulse 78   Temp 36.4 °C (97.6 °F) (Temporal)   Resp 16   Wt 115 kg (254 lb)   SpO2 98%   BMI 33.51 kg/m²   Smoking Status Never   BSA 2.43 m²      O: VSS AFEB Awake, Alert, NAD.  No intoxication, withdrawal, or sedation.  Chest CTA.  Heart RRR.  Ext no c/c/e.   Right foot fused in brace.    Lab Results   Component Value Date    WBC 9.0 01/05/2024    HGB 15.9 01/05/2024    HCT 50.8 01/05/2024    MCV 93 01/05/2024     01/05/2024       Chemistry    Lab Results   Component Value Date/Time     07/08/2024 0920    K 4.3 07/08/2024 0920     07/08/2024 0920    CO2 29 07/08/2024 0920    BUN 12 07/08/2024 0920    CREATININE 0.93 07/08/2024 0920    Lab Results   Component Value Date/Time    CALCIUM 8.8 07/08/2024 0920    ALKPHOS 106 07/08/2024 0920    AST 27 07/08/2024 0920    ALT 27 07/08/2024 " "0920    BILITOT 0.9 07/08/2024 0920          Lab Results   Component Value Date    CHOL 224 (H) 07/21/2023    CHOL 219 (H) 07/22/2022    CHOL 164 11/23/2020     Lab Results   Component Value Date    HDL 35.9 (A) 07/21/2023    HDL 34.9 (A) 07/22/2022    HDL 42.8 11/23/2020     No results found for: \"LDLCALC\"  Lab Results   Component Value Date    TRIG 213 (H) 07/21/2023    TRIG 159 (H) 11/23/2020    TRIG 125 07/21/2020     No components found for: \"CHOLHDL\"   Lab Results   Component Value Date    HGBA1C 4.8 01/05/2024       Assessment/Plan   Problem List Items Addressed This Visit       Diabetes mellitus (Multi) - Primary    Overview     With Carcot foot -- s/p fusion right foot/ankle 2023.    A1c normalized with weight loss and exercise regimen.          Hypogonadism male    Current Assessment & Plan     monitor PSA/CBC normal 1/2024.    Increase from 200 mg q2 weeks to 150 mg q1 week.     Recheck TEST/PSA/CBC in 3-4 months.   Testosterone agreement signed 1/2024               "

## 2024-07-09 NOTE — ASSESSMENT & PLAN NOTE
monitor PSA/CBC normal 1/2024.    Increase from 200 mg q2 weeks to 150 mg q1 week.     Recheck TEST/PSA/CBC in 3-4 months.   Testosterone agreement signed 1/2024

## 2024-07-10 RX ORDER — TESTOSTERONE CYPIONATE 200 MG/ML
150 INJECTION, SOLUTION INTRAMUSCULAR
Qty: 3 ML | Refills: 5 | Status: SHIPPED | OUTPATIENT
Start: 2024-07-10 | End: 2024-12-25

## 2024-07-10 RX ORDER — TESTOSTERONE CYPIONATE 1000 MG/10ML
INJECTION, SOLUTION INTRAMUSCULAR
Refills: 3 | OUTPATIENT
Start: 2024-07-10

## 2024-07-16 ENCOUNTER — HOSPITAL ENCOUNTER (EMERGENCY)
Facility: HOSPITAL | Age: 44
Discharge: HOME | End: 2024-07-16
Payer: COMMERCIAL

## 2024-07-16 ENCOUNTER — APPOINTMENT (OUTPATIENT)
Dept: CARDIOLOGY | Facility: HOSPITAL | Age: 44
End: 2024-07-16
Payer: COMMERCIAL

## 2024-07-16 ENCOUNTER — APPOINTMENT (OUTPATIENT)
Dept: RADIOLOGY | Facility: HOSPITAL | Age: 44
End: 2024-07-16
Payer: COMMERCIAL

## 2024-07-16 VITALS
WEIGHT: 250 LBS | RESPIRATION RATE: 18 BRPM | DIASTOLIC BLOOD PRESSURE: 79 MMHG | OXYGEN SATURATION: 95 % | SYSTOLIC BLOOD PRESSURE: 122 MMHG | TEMPERATURE: 97.9 F | HEART RATE: 90 BPM | HEIGHT: 74 IN | BODY MASS INDEX: 32.08 KG/M2

## 2024-07-16 DIAGNOSIS — R10.13 ABDOMINAL PAIN, EPIGASTRIC: ICD-10-CM

## 2024-07-16 DIAGNOSIS — R10.84 GENERALIZED ABDOMINAL PAIN: Primary | ICD-10-CM

## 2024-07-16 DIAGNOSIS — K29.70 GASTRITIS WITHOUT BLEEDING, UNSPECIFIED CHRONICITY, UNSPECIFIED GASTRITIS TYPE: ICD-10-CM

## 2024-07-16 LAB
ALBUMIN SERPL BCP-MCNC: 4.2 G/DL (ref 3.4–5)
ALP SERPL-CCNC: 112 U/L (ref 33–120)
ALT SERPL W P-5'-P-CCNC: 32 U/L (ref 10–52)
ANION GAP SERPL CALC-SCNC: 13 MMOL/L (ref 10–20)
APPEARANCE UR: CLEAR
APTT PPP: 23 SECONDS (ref 27–38)
AST SERPL W P-5'-P-CCNC: 40 U/L (ref 9–39)
BACTERIA #/AREA URNS AUTO: ABNORMAL /HPF
BASOPHILS # BLD AUTO: 0.06 X10*3/UL (ref 0–0.1)
BASOPHILS NFR BLD AUTO: 0.6 %
BILIRUB DIRECT SERPL-MCNC: 0.1 MG/DL (ref 0–0.3)
BILIRUB SERPL-MCNC: 1.2 MG/DL (ref 0–1.2)
BILIRUB UR STRIP.AUTO-MCNC: NEGATIVE MG/DL
BUN SERPL-MCNC: 9 MG/DL (ref 6–23)
CALCIUM SERPL-MCNC: 9.6 MG/DL (ref 8.6–10.3)
CHLORIDE SERPL-SCNC: 103 MMOL/L (ref 98–107)
CO2 SERPL-SCNC: 29 MMOL/L (ref 21–32)
COLOR UR: YELLOW
CREAT SERPL-MCNC: 0.88 MG/DL (ref 0.5–1.3)
EGFRCR SERPLBLD CKD-EPI 2021: >90 ML/MIN/1.73M*2
EOSINOPHIL # BLD AUTO: 0.11 X10*3/UL (ref 0–0.7)
EOSINOPHIL NFR BLD AUTO: 1 %
ERYTHROCYTE [DISTWIDTH] IN BLOOD BY AUTOMATED COUNT: 13.9 % (ref 11.5–14.5)
GLUCOSE SERPL-MCNC: 89 MG/DL (ref 74–99)
GLUCOSE UR STRIP.AUTO-MCNC: NORMAL MG/DL
HCT VFR BLD AUTO: 52.7 % (ref 41–52)
HGB BLD-MCNC: 16.8 G/DL (ref 13.5–17.5)
IMM GRANULOCYTES # BLD AUTO: 0.06 X10*3/UL (ref 0–0.7)
IMM GRANULOCYTES NFR BLD AUTO: 0.6 % (ref 0–0.9)
INR PPP: 1.1 (ref 0.9–1.1)
KETONES UR STRIP.AUTO-MCNC: ABNORMAL MG/DL
LACTATE SERPL-SCNC: 1.6 MMOL/L (ref 0.4–2)
LEUKOCYTE ESTERASE UR QL STRIP.AUTO: ABNORMAL
LYMPHOCYTES # BLD AUTO: 2.14 X10*3/UL (ref 1.2–4.8)
LYMPHOCYTES NFR BLD AUTO: 19.9 %
MAGNESIUM SERPL-MCNC: 2.04 MG/DL (ref 1.6–2.4)
MCH RBC QN AUTO: 27.9 PG (ref 26–34)
MCHC RBC AUTO-ENTMCNC: 31.9 G/DL (ref 32–36)
MCV RBC AUTO: 88 FL (ref 80–100)
MONOCYTES # BLD AUTO: 0.81 X10*3/UL (ref 0.1–1)
MONOCYTES NFR BLD AUTO: 7.5 %
MUCOUS THREADS #/AREA URNS AUTO: ABNORMAL /LPF
NEUTROPHILS # BLD AUTO: 7.56 X10*3/UL (ref 1.2–7.7)
NEUTROPHILS NFR BLD AUTO: 70.4 %
NITRITE UR QL STRIP.AUTO: NEGATIVE
NRBC BLD-RTO: 0 /100 WBCS (ref 0–0)
PH UR STRIP.AUTO: 6.5 [PH]
PLATELET # BLD AUTO: 187 X10*3/UL (ref 150–450)
POTASSIUM SERPL-SCNC: 4.6 MMOL/L (ref 3.5–5.3)
PROT SERPL-MCNC: 7.3 G/DL (ref 6.4–8.2)
PROT UR STRIP.AUTO-MCNC: NEGATIVE MG/DL
PROTHROMBIN TIME: 12 SECONDS (ref 9.8–12.8)
RBC # BLD AUTO: 6.02 X10*6/UL (ref 4.5–5.9)
RBC # UR STRIP.AUTO: NEGATIVE /UL
RBC #/AREA URNS AUTO: ABNORMAL /HPF
SODIUM SERPL-SCNC: 140 MMOL/L (ref 136–145)
SP GR UR STRIP.AUTO: 1.01
SQUAMOUS #/AREA URNS AUTO: ABNORMAL /HPF
UROBILINOGEN UR STRIP.AUTO-MCNC: NORMAL MG/DL
WBC # BLD AUTO: 10.7 X10*3/UL (ref 4.4–11.3)
WBC #/AREA URNS AUTO: ABNORMAL /HPF

## 2024-07-16 PROCEDURE — 83735 ASSAY OF MAGNESIUM: CPT | Performed by: NURSE PRACTITIONER

## 2024-07-16 PROCEDURE — 82248 BILIRUBIN DIRECT: CPT | Performed by: NURSE PRACTITIONER

## 2024-07-16 PROCEDURE — 83605 ASSAY OF LACTIC ACID: CPT | Performed by: NURSE PRACTITIONER

## 2024-07-16 PROCEDURE — 96375 TX/PRO/DX INJ NEW DRUG ADDON: CPT | Mod: 59

## 2024-07-16 PROCEDURE — 96374 THER/PROPH/DIAG INJ IV PUSH: CPT | Mod: 59

## 2024-07-16 PROCEDURE — 96361 HYDRATE IV INFUSION ADD-ON: CPT

## 2024-07-16 PROCEDURE — 74174 CTA ABD&PLVS W/CONTRAST: CPT

## 2024-07-16 PROCEDURE — 93005 ELECTROCARDIOGRAM TRACING: CPT

## 2024-07-16 PROCEDURE — 2550000001 HC RX 255 CONTRASTS: Performed by: NURSE PRACTITIONER

## 2024-07-16 PROCEDURE — 85025 COMPLETE CBC W/AUTO DIFF WBC: CPT | Performed by: NURSE PRACTITIONER

## 2024-07-16 PROCEDURE — 81001 URINALYSIS AUTO W/SCOPE: CPT | Performed by: NURSE PRACTITIONER

## 2024-07-16 PROCEDURE — 2500000004 HC RX 250 GENERAL PHARMACY W/ HCPCS (ALT 636 FOR OP/ED): Performed by: NURSE PRACTITIONER

## 2024-07-16 PROCEDURE — 36415 COLL VENOUS BLD VENIPUNCTURE: CPT | Performed by: NURSE PRACTITIONER

## 2024-07-16 PROCEDURE — 85610 PROTHROMBIN TIME: CPT | Performed by: NURSE PRACTITIONER

## 2024-07-16 PROCEDURE — 80053 COMPREHEN METABOLIC PANEL: CPT | Performed by: NURSE PRACTITIONER

## 2024-07-16 PROCEDURE — 85730 THROMBOPLASTIN TIME PARTIAL: CPT | Performed by: NURSE PRACTITIONER

## 2024-07-16 PROCEDURE — C9113 INJ PANTOPRAZOLE SODIUM, VIA: HCPCS | Performed by: NURSE PRACTITIONER

## 2024-07-16 PROCEDURE — 99285 EMERGENCY DEPT VISIT HI MDM: CPT | Mod: 25

## 2024-07-16 PROCEDURE — 74174 CTA ABD&PLVS W/CONTRAST: CPT | Performed by: STUDENT IN AN ORGANIZED HEALTH CARE EDUCATION/TRAINING PROGRAM

## 2024-07-16 RX ORDER — ONDANSETRON HYDROCHLORIDE 2 MG/ML
4 INJECTION, SOLUTION INTRAVENOUS ONCE
Status: COMPLETED | OUTPATIENT
Start: 2024-07-16 | End: 2024-07-16

## 2024-07-16 RX ORDER — PANTOPRAZOLE SODIUM 40 MG/10ML
80 INJECTION, POWDER, LYOPHILIZED, FOR SOLUTION INTRAVENOUS ONCE
Status: COMPLETED | OUTPATIENT
Start: 2024-07-16 | End: 2024-07-16

## 2024-07-16 RX ORDER — FAMOTIDINE 20 MG/1
40 TABLET, FILM COATED ORAL NIGHTLY
Qty: 30 TABLET | Refills: 0 | Status: SHIPPED | OUTPATIENT
Start: 2024-07-16 | End: 2024-07-23

## 2024-07-16 ASSESSMENT — PAIN DESCRIPTION - ORIENTATION: ORIENTATION: RIGHT;LOWER

## 2024-07-16 ASSESSMENT — LIFESTYLE VARIABLES
HAVE YOU EVER FELT YOU SHOULD CUT DOWN ON YOUR DRINKING: NO
HAVE PEOPLE ANNOYED YOU BY CRITICIZING YOUR DRINKING: NO
TOTAL SCORE: 0
EVER HAD A DRINK FIRST THING IN THE MORNING TO STEADY YOUR NERVES TO GET RID OF A HANGOVER: NO
EVER FELT BAD OR GUILTY ABOUT YOUR DRINKING: NO

## 2024-07-16 ASSESSMENT — PAIN - FUNCTIONAL ASSESSMENT: PAIN_FUNCTIONAL_ASSESSMENT: 0-10

## 2024-07-16 ASSESSMENT — PAIN DESCRIPTION - LOCATION: LOCATION: ABDOMEN

## 2024-07-16 ASSESSMENT — PAIN SCALES - GENERAL: PAINLEVEL_OUTOF10: 1

## 2024-07-16 NOTE — ED PROVIDER NOTES
[facility]  ED  Encounter Note  Admit Date/RoomTime: 2024  4:26 PM  [facility]  NAME: Nicanor White  : 1980  MRN: [id number]     Chief Complaint:  Abdominal Pain (Started this AM RLQ. Endorses nausea/vomiting. Denies diarrhea/constipation. No abdominal surgery hx Endorses lethargy, diaphoresis. States his vomit was black/red in color. No thinners)    HISTORY OF PRESENT ILLNESS        Nicanor White is a 43 y.o. male who presents to the ED for evaluation of abdominal pain with nausea vomiting and some epigastric pain. Denied blood thinners but has taken an aspirin since he had his fusion surgery on this right foot. He stopped taken it two days ago due to some stomach discomfort. Not sure if acid reflux or not. Has lost about 175 lbs and does not really have acid reflux. Nothing makes better or worse. Vomited a bunch of times and had some brown/red material. Not sure if blood or not. Has pictures. Takes no meds otherwise.  No prior hx of same. Vomited about an hour ago last.    ROS   Pertinent positives and negatives are stated within HPI, all other systems reviewed and are negative.    Past Medical History:  has a past medical history of Cellulitis of unspecified part of limb (05/15/2020), Other acute postprocedural pain (2020), Personal history of other infectious and parasitic diseases (2020), and Personal history of other specified conditions (2020).    Surgical History:  has a past surgical history that includes Other surgical history (2020) and External fixator application (Right).    Social History:  reports that he has never smoked. He has never been exposed to tobacco smoke. He has never used smokeless tobacco. He reports current alcohol use. He reports that he does not use drugs.    Family History: family history is not on file.     Allergies: Carbinoxamine-pseudoephedrine and Rondec (brompheniramine)  [brompheniramine-pseudoephedrin]    PHYSICAL EXAM   Oxygen Saturation Interpretation: Normal.      Physical Exam  Constitutional/General: Alert and oriented x3, well appearing, non toxic  HEENT:  NC/NT. PERRLA.  Airway patent.  Neck: Supple, full ROM. No midline vertebral tenderness or crepitus.   Respiratory: Lung sounds clear to auscultation bilaterally. No wheezes, rhonchi or stridor. Not in respiratory distress.  CV:  Regular rate. Regular rhythm. No murmurs or rubs. 2+ distal pulses.  GI:  Abdomen soft, non-tender, non-distended. +BS. No rebound, guarding, or rigidity. No pulsatile masses.  Musculoskeletal: Moves all extremities x 4. Warm and well perfused. Capillary refill <3 seconds  Integument: Skin warm and dry. No rashes.   Neurologic: Alert and oriented with no focal deficits, symmetric strength 5/5 in the upper and lower extremities bilaterally.  Psychiatric: Normal affect.//right lower leg boot    Lab / Imaging Results   (All laboratory and radiology results have been personally reviewed by myself)  Labs:  Results for orders placed or performed during the hospital encounter of 07/16/24   CBC and Auto Differential   Result Value Ref Range    WBC 10.7 4.4 - 11.3 x10*3/uL    nRBC 0.0 0.0 - 0.0 /100 WBCs    RBC 6.02 (H) 4.50 - 5.90 x10*6/uL    Hemoglobin 16.8 13.5 - 17.5 g/dL    Hematocrit 52.7 (H) 41.0 - 52.0 %    MCV 88 80 - 100 fL    MCH 27.9 26.0 - 34.0 pg    MCHC 31.9 (L) 32.0 - 36.0 g/dL    RDW 13.9 11.5 - 14.5 %    Platelets 187 150 - 450 x10*3/uL    Neutrophils % 70.4 40.0 - 80.0 %    Immature Granulocytes %, Automated 0.6 0.0 - 0.9 %    Lymphocytes % 19.9 13.0 - 44.0 %    Monocytes % 7.5 2.0 - 10.0 %    Eosinophils % 1.0 0.0 - 6.0 %    Basophils % 0.6 0.0 - 2.0 %    Neutrophils Absolute 7.56 1.20 - 7.70 x10*3/uL    Immature Granulocytes Absolute, Automated 0.06 0.00 - 0.70 x10*3/uL    Lymphocytes Absolute 2.14 1.20 - 4.80 x10*3/uL    Monocytes Absolute 0.81 0.10 - 1.00 x10*3/uL    Eosinophils  Absolute 0.11 0.00 - 0.70 x10*3/uL    Basophils Absolute 0.06 0.00 - 0.10 x10*3/uL   Comprehensive metabolic panel   Result Value Ref Range    Glucose 89 74 - 99 mg/dL    Sodium 140 136 - 145 mmol/L    Potassium 4.6 3.5 - 5.3 mmol/L    Chloride 103 98 - 107 mmol/L    Bicarbonate 29 21 - 32 mmol/L    Anion Gap 13 10 - 20 mmol/L    Urea Nitrogen 9 6 - 23 mg/dL    Creatinine 0.88 0.50 - 1.30 mg/dL    eGFR >90 >60 mL/min/1.73m*2    Calcium 9.6 8.6 - 10.3 mg/dL    Albumin 4.2 3.4 - 5.0 g/dL    Alkaline Phosphatase 112 33 - 120 U/L    Total Protein 7.3 6.4 - 8.2 g/dL    AST 40 (H) 9 - 39 U/L    Bilirubin, Total 1.2 0.0 - 1.2 mg/dL    ALT 32 10 - 52 U/L   Magnesium   Result Value Ref Range    Magnesium 2.04 1.60 - 2.40 mg/dL   Lactate   Result Value Ref Range    Lactate 1.6 0.4 - 2.0 mmol/L   Protime-INR   Result Value Ref Range    Protime 12.0 9.8 - 12.8 seconds    INR 1.1 0.9 - 1.1   aPTT   Result Value Ref Range    aPTT 23 (L) 27 - 38 seconds   Urinalysis with Reflex Microscopic   Result Value Ref Range    Color, Urine Yellow Light-Yellow, Yellow, Dark-Yellow    Appearance, Urine Clear Clear    Specific Gravity, Urine 1.013 1.005 - 1.035    pH, Urine 6.5 5.0, 5.5, 6.0, 6.5, 7.0, 7.5, 8.0    Protein, Urine NEGATIVE NEGATIVE, 10 (TRACE), 20 (TRACE) mg/dL    Glucose, Urine Normal Normal mg/dL    Blood, Urine NEGATIVE NEGATIVE    Ketones, Urine TRACE (A) NEGATIVE mg/dL    Bilirubin, Urine NEGATIVE NEGATIVE    Urobilinogen, Urine Normal Normal mg/dL    Nitrite, Urine NEGATIVE NEGATIVE    Leukocyte Esterase, Urine 250 Eli/µL (A) NEGATIVE   Bilirubin, Direct   Result Value Ref Range    Bilirubin, Direct 0.1 0.0 - 0.3 mg/dL   Microscopic Only, Urine   Result Value Ref Range    WBC, Urine 6-10 (A) 1-5, NONE /HPF    RBC, Urine 1-2 NONE, 1-2, 3-5 /HPF    Squamous Epithelial Cells, Urine 1-9 (SPARSE) Reference range not established. /HPF    Bacteria, Urine 1+ (A) NONE SEEN /HPF    Mucus, Urine FEW Reference range not established.  /LPF   ECG 12 lead   Result Value Ref Range    Ventricular Rate 82 BPM    Atrial Rate 82 BPM    NY Interval 144 ms    QRS Duration 84 ms    QT Interval 354 ms    QTC Calculation(Bazett) 413 ms    P Axis 38 degrees    R Axis 12 degrees    T Axis 38 degrees    QRS Count 14 beats    Q Onset 217 ms    P Onset 145 ms    P Offset 191 ms    T Offset 394 ms    QTC Fredericia 392 ms     Imaging:  All Radiology results interpreted by Radiologist unless otherwise noted.  CT angio abdomen pelvis w and or wo IV IV contrast   Final Result   1. No evidence of active gastrointestinal hemorrhage.        2. No acute process within the abdomen or pelvis.        3. Cholelithiasis without acute cholecystitis.        4. Patent steatosis.        MACRO:   None.        Signed by: Trell Hussein 7/16/2024 7:29 PM   Dictation workstation:   [id number]          ED Course / Medical Decision Making     Medications   sodium chloride 0.9 % bolus 2,000 mL (0 mL intravenous Stopped 7/16/24 1901)   ondansetron (Zofran) injection 4 mg (4 mg intravenous Given 7/16/24 1701)   pantoprazole (ProtoNix) injection 80 mg (80 mg intravenous Given 7/16/24 1701)   iohexol (OMNIPaque) 350 mg iodine/mL solution 90 mL (90 mL intravenous Given 7/16/24 1834)     ED Course as of 07/18/24 1519   Tue Jul 16, 2024   1656 Rate 82 bpm pr interval 144 ms qrs du ration 84 ms qt qtc 354/413 ms prt axes 38 12 38 normal rate rhythm and axis. Personally reviewed by me [PK]      ED Course User Index  [PK] MAGUI Sanchez-CNP         Diagnoses as of 07/18/24 1519   Generalized abdominal pain   Abdominal pain, epigastric   Gastritis without bleeding, unspecified chronicity, unspecified gastritis type     Re-examination:    Patient’s condition stable.        MDM:        Nicanor White is a 43 y.o. male who presents to the ED for evaluation of abdominal pain with nausea vomiting and some epigastric pain. Denied blood thinners but has taken an aspirin since he had his  fusion surgery on this right foot. He stopped taken it two days ago due to some stomach discomfort. Not sure if acid reflux or not. Has lost about 175 lbs and does not really have acid reflux. Nothing makes better or worse. Vomited a bunch of times and had some brown/red material. Not sure if blood or not. Has pictures. Takes no meds otherwise.  No prior hx of same. Vomited about an hour ago last.    ED course stable  Felt better with fluids  Will discharge home on oral pepcid for a week  Discuss with surgeon re aspirin can take ec if needed  Light diet and progress  See GI in followup  If having any reoccurrence of symptoms return back to ED ASAP  Labs cbc cmp lactate ct scan abd pelvis all stable  Ddx: abdominal pain viral syndrome gastritis     Plan of Care/Counseling:  I reviewed today's visit with the patient and wife in addition to providing specific details for the plan of care and counseling regarding the diagnosis and prognosis.  Questions are answered at this time and are agreeable with the plan.    ASSESSMENT     1. Generalized abdominal pain    2. Abdominal pain, epigastric    3. Gastritis without bleeding, unspecified chronicity, unspecified gastritis type      PLAN   Home Referral GI, Advised to return for signs of head injury, weakness, numbness or tingling to extremities, incontinence, and Advised to return for worsening or additional problems such as abdominal or chest pain  Diagnostic tests were reviewed and questions answered. Diagnosis, care plan and treatment options were discussed. The patient and spouse/SO understand instructions and will follow up as directed.  Condition stable  The patient and spouse was given verbal follow-up instructions  Patient condition is stable    New Medications     New Medications Ordered This Visit   Medications    sodium chloride 0.9 % bolus 2,000 mL    ondansetron (Zofran) injection 4 mg    pantoprazole (ProtoNix) injection 80 mg    iohexol (OMNIPaque) 350 mg  iodine/mL solution 90 mL    famotidine (Pepcid) 20 mg tablet     Sig: Take 2 tablets (40 mg) by mouth once daily at bedtime for 7 days.     Dispense:  30 tablet     Refill:  0     Electronically signed by MARY Sanchez     **This report was transcribed using voice recognition software. Every effort was made to ensure accuracy; however, inadvertent computerized transcription errors may be present.  END OF ED PROVIDER NOTE     MARY Sanchez  07/18/24 0383

## 2024-07-16 NOTE — ED TRIAGE NOTES
Patient here for abdominal pain Started this AM RLQ. Endorses nausea/vomiting. Denies diarrhea/constipation. No abdominal surgery hx Endorses lethargy, diaphoresis. States his vomit was black/red in color. No thinners

## 2024-07-16 NOTE — DISCHARGE INSTRUCTIONS
Clear liquid diet progress tomorrow  Pepcid for one week  Call Dr Marcelo    RETURN IF ANY ISSUES!

## 2024-07-17 LAB
ATRIAL RATE: 82 BPM
P AXIS: 38 DEGREES
P OFFSET: 191 MS
P ONSET: 145 MS
PR INTERVAL: 144 MS
Q ONSET: 217 MS
QRS COUNT: 14 BEATS
QRS DURATION: 84 MS
QT INTERVAL: 354 MS
QTC CALCULATION(BAZETT): 413 MS
QTC FREDERICIA: 392 MS
R AXIS: 12 DEGREES
T AXIS: 38 DEGREES
T OFFSET: 394 MS
VENTRICULAR RATE: 82 BPM

## 2024-07-17 NOTE — PROGRESS NOTES
In ER 7/16/24 with UGI bleed. Hold aspirin x 2 weeks.  Agree with famotidine 2 daily x 1 week then continue 20 mg daily indefinitely so we can restart aspirin. MP

## 2024-08-07 ENCOUNTER — PATIENT MESSAGE (OUTPATIENT)
Dept: PRIMARY CARE | Facility: CLINIC | Age: 44
End: 2024-08-07
Payer: COMMERCIAL

## 2024-08-12 DIAGNOSIS — K29.70 GASTRITIS WITHOUT BLEEDING, UNSPECIFIED CHRONICITY, UNSPECIFIED GASTRITIS TYPE: ICD-10-CM

## 2024-08-12 DIAGNOSIS — E29.1 HYPOGONADISM MALE: ICD-10-CM

## 2024-08-12 RX ORDER — FAMOTIDINE 20 MG/1
40 TABLET, FILM COATED ORAL NIGHTLY
Qty: 90 TABLET | Refills: 3 | Status: SHIPPED | OUTPATIENT
Start: 2024-08-12

## 2024-08-12 RX ORDER — SYRINGE, DISPOSABLE, 3 ML
SYRINGE, EMPTY DISPOSABLE MISCELLANEOUS
Qty: 13 EACH | Refills: 3 | Status: SHIPPED | OUTPATIENT
Start: 2024-08-12

## 2024-08-12 RX ORDER — TESTOSTERONE CYPIONATE 200 MG/ML
150 INJECTION, SOLUTION INTRAMUSCULAR
Qty: 3 ML | Refills: 5 | Status: SHIPPED | OUTPATIENT
Start: 2024-08-12 | End: 2025-01-27

## 2024-10-01 ENCOUNTER — LAB (OUTPATIENT)
Dept: LAB | Facility: LAB | Age: 44
End: 2024-10-01
Payer: COMMERCIAL

## 2024-10-01 DIAGNOSIS — E29.1 HYPOGONADISM MALE: ICD-10-CM

## 2024-10-01 LAB — TESTOST SERPL-MCNC: 1270 NG/DL (ref 240–1000)

## 2024-10-01 PROCEDURE — 36415 COLL VENOUS BLD VENIPUNCTURE: CPT

## 2024-10-01 PROCEDURE — 84403 ASSAY OF TOTAL TESTOSTERONE: CPT

## 2025-02-11 DIAGNOSIS — E11.610 TYPE 2 DIABETES MELLITUS WITH DIABETIC NEUROPATHIC ARTHROPATHY, WITHOUT LONG-TERM CURRENT USE OF INSULIN (MULTI): ICD-10-CM

## 2025-02-11 DIAGNOSIS — Z12.5 SCREENING FOR PROSTATE CANCER: Primary | ICD-10-CM

## 2025-02-11 DIAGNOSIS — E29.1 HYPOGONADISM MALE: ICD-10-CM

## 2025-02-12 ENCOUNTER — TELEPHONE (OUTPATIENT)
Dept: PRIMARY CARE | Facility: CLINIC | Age: 45
End: 2025-02-12
Payer: COMMERCIAL

## 2025-02-27 LAB
ALBUMIN SERPL-MCNC: 4.5 G/DL (ref 3.6–5.1)
ALBUMIN/CREAT UR: 5 MG/G CREAT
ALP SERPL-CCNC: 96 U/L (ref 36–130)
ALT SERPL-CCNC: 37 U/L (ref 9–46)
ANION GAP SERPL CALCULATED.4IONS-SCNC: 11 MMOL/L (CALC) (ref 7–17)
AST SERPL-CCNC: 34 U/L (ref 10–40)
BILIRUB SERPL-MCNC: 1.2 MG/DL (ref 0.2–1.2)
BUN SERPL-MCNC: 19 MG/DL (ref 7–25)
CALCIUM SERPL-MCNC: 9.6 MG/DL (ref 8.6–10.3)
CHLORIDE SERPL-SCNC: 100 MMOL/L (ref 98–110)
CHOLEST SERPL-MCNC: 173 MG/DL
CHOLEST/HDLC SERPL: 4.7 (CALC)
CO2 SERPL-SCNC: 28 MMOL/L (ref 20–32)
CREAT SERPL-MCNC: 1.03 MG/DL (ref 0.6–1.29)
CREAT UR-MCNC: 40 MG/DL (ref 20–320)
EGFRCR SERPLBLD CKD-EPI 2021: 92 ML/MIN/1.73M2
ERYTHROCYTE [DISTWIDTH] IN BLOOD BY AUTOMATED COUNT: 12.5 % (ref 11–15)
EST. AVERAGE GLUCOSE BLD GHB EST-MCNC: 94 MG/DL
EST. AVERAGE GLUCOSE BLD GHB EST-SCNC: 5.2 MMOL/L
GLUCOSE SERPL-MCNC: 98 MG/DL (ref 65–139)
HBA1C MFR BLD: 4.9 % OF TOTAL HGB
HCT VFR BLD AUTO: 56.5 % (ref 38.5–50)
HDLC SERPL-MCNC: 37 MG/DL
HGB BLD-MCNC: 18.2 G/DL (ref 13.2–17.1)
LDLC SERPL CALC-MCNC: 99 MG/DL (CALC)
MCH RBC QN AUTO: 29.3 PG (ref 27–33)
MCHC RBC AUTO-ENTMCNC: 32.2 G/DL (ref 32–36)
MCV RBC AUTO: 90.8 FL (ref 80–100)
MICROALBUMIN UR-MCNC: 0.2 MG/DL
NONHDLC SERPL-MCNC: 136 MG/DL (CALC)
PLATELET # BLD AUTO: 171 THOUSAND/UL (ref 140–400)
PMV BLD REES-ECKER: 11.8 FL (ref 7.5–12.5)
POTASSIUM SERPL-SCNC: 4.5 MMOL/L (ref 3.5–5.3)
PROT SERPL-MCNC: 7.1 G/DL (ref 6.1–8.1)
PSA SERPL-MCNC: 0.48 NG/ML
RBC # BLD AUTO: 6.22 MILLION/UL (ref 4.2–5.8)
SODIUM SERPL-SCNC: 139 MMOL/L (ref 135–146)
TESTOST SERPL-MCNC: 765 NG/DL (ref 250–827)
TRIGL SERPL-MCNC: 241 MG/DL
WBC # BLD AUTO: 11.1 THOUSAND/UL (ref 3.8–10.8)

## 2025-03-03 DIAGNOSIS — E29.1 HYPOGONADISM MALE: ICD-10-CM

## 2025-03-03 RX ORDER — TESTOSTERONE CYPIONATE 200 MG/ML
150 INJECTION, SOLUTION INTRAMUSCULAR
Qty: 3 ML | Refills: 5 | Status: SHIPPED | OUTPATIENT
Start: 2025-03-03 | End: 2025-08-18

## 2025-03-14 ENCOUNTER — APPOINTMENT (OUTPATIENT)
Dept: PRIMARY CARE | Facility: CLINIC | Age: 45
End: 2025-03-14
Payer: COMMERCIAL

## 2025-03-14 VITALS
OXYGEN SATURATION: 99 % | HEART RATE: 72 BPM | BODY MASS INDEX: 30.42 KG/M2 | TEMPERATURE: 97.8 F | WEIGHT: 237 LBS | HEIGHT: 74 IN | SYSTOLIC BLOOD PRESSURE: 122 MMHG | DIASTOLIC BLOOD PRESSURE: 86 MMHG

## 2025-03-14 DIAGNOSIS — M14.671 CHARCOT'S JOINT OF RIGHT ANKLE: ICD-10-CM

## 2025-03-14 DIAGNOSIS — Z12.11 COLON CANCER SCREENING: ICD-10-CM

## 2025-03-14 DIAGNOSIS — Z89.421 RIGHT TOE AMPUTEE (MULTI): ICD-10-CM

## 2025-03-14 DIAGNOSIS — E29.1 HYPOGONADISM MALE: ICD-10-CM

## 2025-03-14 DIAGNOSIS — E11.610 TYPE 2 DIABETES MELLITUS WITH DIABETIC NEUROPATHIC ARTHROPATHY, WITHOUT LONG-TERM CURRENT USE OF INSULIN (MULTI): ICD-10-CM

## 2025-03-14 DIAGNOSIS — Z00.00 WELL ADULT EXAM: Primary | ICD-10-CM

## 2025-03-14 DIAGNOSIS — F32.5 MAJOR DEPRESSION IN REMISSION (CMS-HCC): ICD-10-CM

## 2025-03-14 PROBLEM — G54.6 PHANTOM LIMB SYNDROME WITH PAIN: Status: RESOLVED | Noted: 2023-07-21 | Resolved: 2025-03-14

## 2025-03-14 PROCEDURE — 3079F DIAST BP 80-89 MM HG: CPT | Performed by: FAMILY MEDICINE

## 2025-03-14 PROCEDURE — 1036F TOBACCO NON-USER: CPT | Performed by: FAMILY MEDICINE

## 2025-03-14 PROCEDURE — 3008F BODY MASS INDEX DOCD: CPT | Performed by: FAMILY MEDICINE

## 2025-03-14 PROCEDURE — 99396 PREV VISIT EST AGE 40-64: CPT | Performed by: FAMILY MEDICINE

## 2025-03-14 PROCEDURE — 3074F SYST BP LT 130 MM HG: CPT | Performed by: FAMILY MEDICINE

## 2025-03-14 ASSESSMENT — ENCOUNTER SYMPTOMS
HEMATURIA: 0
BACK PAIN: 0
EYE PAIN: 0
NECK STIFFNESS: 0
SHORTNESS OF BREATH: 0
POLYDIPSIA: 0
FEVER: 0
ABDOMINAL PAIN: 0
SORE THROAT: 0
BRUISES/BLEEDS EASILY: 0
CHILLS: 0
WOUND: 0
HALLUCINATIONS: 0
FATIGUE: 0
EYE REDNESS: 0
RHINORRHEA: 0
PALPITATIONS: 0
BLOOD IN STOOL: 0
DYSURIA: 0
HEADACHES: 0
WEAKNESS: 0
ADENOPATHY: 0
COUGH: 0

## 2025-03-14 NOTE — PROGRESS NOTES
"Nicanor White is a 44 y.o. male with Chief Complaint of Annual [facility] preventive exam and     Recheck on chronic stable DM, BPH with obstruction, and low testosterone.      [employer] spring 2024-- Lynn (A-rosangela).    S/P Right ankle/foot fusion summer 2023 -- Iusu.    Gaining weight on increased testosterone and exercising in gym.  Crashing energy wise after 7-10 days.  No side effects.     OARRS:  Dylan Mckeon MD on 3/14/2025 10:50 AM  I have personally reviewed the OARRS report for Nicanor White. I have considered the risks of abuse, dependence, addiction and diversion and I believe that it is clinically appropriate for Nicanor White to be prescribed this medication    Is the patient prescribed a combination of a benzodiazepine and opioid?  Yes, I feel it is clincially indicated to continue the medication and have discussed with the patient risks/benefits/alternatives.    Last Urine Drug Screen / ordered today: No  No results found for this or any previous visit (from the past 8760 hours).  Results are as expected.     Clinical rationale for not completing a Urine Drug Screen: Patient is a minor (under 18 years old)      Controlled Substance Agreement:  Date of the Last Agreement: reviewed 3/2025  Reviewed Controlled Substance Agreement including but not limited to the benefits, risks, and alternatives to treatment with a Controlled Substance medication(s).    Testosterone:  What is the patient's goal of therapy? Increased energy, libido  Is this being achieved with current treatment? yes    I attest the patient does not have: Breast Cancer, Polycythemia, or Prostate Cancer    Last Testosterone check:  TESTOSTERONE, TOTAL, MALES (ADULT), IA   Date Value Ref Range Status   02/26/2025 765 250 - 827 ng/dL Final       Last CBC:    No results found for: \"CBCDIF\", \"BMCBC\", \"PR1\"    Last PSA:   PSA, TOTAL   Date Value Ref Range Status   02/26/2025 0.48 < OR = 4.00 ng/mL Final     Comment:     " The total PSA value from this assay system is   standardized against the WHO standard. The test   result will be approximately 20% lower when compared   to the equimolar-standardized total PSA (Rajwinder   Shelia). Comparison of serial PSA results should be   interpreted with this fact in mind.     This test was performed using the Siemens   chemiluminescent method. Values obtained from   different assay methods cannot be used  interchangeably. PSA levels, regardless of  value, should not be interpreted as absolute  evidence of the presence or absence of disease.         Activities of Daily Living:   Is your overall impression that this patient is benefiting (symptom reduction outweighs side effects) from testosterone therapy? Yes     1. Physical Functioning: Better  2. Family Relationship: Same  3. Social Relationship: Same  4. Mood: Same  5. Sleep Patterns: Same  6. Overall Function: Better      Past Surgical History:   Procedure Laterality Date    EXTERNAL FIXATOR APPLICATION Right     lower extremity    OTHER SURGICAL HISTORY  05/14/2020    Toe amputation      Social History     Socioeconomic History    Marital status:      Spouse name: Not on file    Number of children: Not on file    Years of education: Not on file    Highest education level: Not on file   Occupational History    Not on file   Tobacco Use    Smoking status: Never     Passive exposure: Never    Smokeless tobacco: Never   Vaping Use    Vaping status: Never Used   Substance and Sexual Activity    Alcohol use: Yes     Comment: social    Drug use: Never    Sexual activity: Yes     Partners: Female   Other Topics Concern    Not on file   Social History Narrative    Not on file     Social Drivers of Health     Financial Resource Strain: Not on file   Food Insecurity: Not on file   Transportation Needs: Not on file   Physical Activity: Not on file   Stress: Not on file   Social Connections: Not on file   Intimate Partner Violence: Not on file  "  Housing Stability: Not on file     Past Medical History:   Diagnosis Date    Cellulitis of unspecified part of limb 05/15/2020    Cellulitis of foot    Other acute postprocedural pain 08/07/2020    Post-operative pain    Personal history of other infectious and parasitic diseases 05/28/2020    History of herpes zoster    Personal history of other specified conditions 12/18/2020    History of fatigue    Phantom limb syndrome with pain (Multi) 07/21/2023    Cymbalta helped but made urine retention worse -- weaned off.         No family history on file.     Review of Systems   Constitutional:  Negative for chills, fatigue and fever.   HENT:  Negative for rhinorrhea and sore throat.    Eyes:  Negative for pain and redness.   Respiratory:  Negative for cough and shortness of breath.    Cardiovascular:  Negative for chest pain and palpitations.   Gastrointestinal:  Negative for abdominal pain and blood in stool.   Endocrine: Negative for polydipsia and polyuria.   Genitourinary:  Negative for dysuria and hematuria.   Musculoskeletal:  Negative for back pain and neck stiffness.   Skin:  Negative for rash and wound.   Allergic/Immunologic: Negative for environmental allergies and food allergies.   Neurological:  Negative for weakness and headaches.   Hematological:  Negative for adenopathy. Does not bruise/bleed easily.   Psychiatric/Behavioral:  Negative for hallucinations and suicidal ideas.       /86   Pulse 72   Temp 36.6 °C (97.8 °F)   Ht 1.88 m (6' 2\")   Wt 108 kg (237 lb)   SpO2 99%   BMI 30.43 kg/m²   Physical Exam  Vitals reviewed.   Constitutional:       General: He is not in acute distress.     Appearance: He is not ill-appearing.   HENT:      Head: Normocephalic and atraumatic.      Right Ear: Tympanic membrane normal.      Left Ear: Tympanic membrane normal.      Nose: No congestion or rhinorrhea.      Mouth/Throat:      Pharynx: No oropharyngeal exudate or posterior oropharyngeal erythema. " "  Eyes:      Extraocular Movements: Extraocular movements intact.      Conjunctiva/sclera: Conjunctivae normal.      Pupils: Pupils are equal, round, and reactive to light.   Cardiovascular:      Rate and Rhythm: Normal rate and regular rhythm.      Heart sounds: No murmur heard.     No friction rub. No gallop.   Pulmonary:      Effort: Pulmonary effort is normal.      Breath sounds: Normal breath sounds. No wheezing, rhonchi or rales.   Abdominal:      General: There is no distension.      Palpations: Abdomen is soft.      Tenderness: There is no abdominal tenderness. There is no guarding or rebound.   Musculoskeletal:         General: No swelling or deformity.      Cervical back: Normal range of motion and neck supple.      Right lower leg: No edema.      Left lower leg: No edema.   Skin:     Capillary Refill: Capillary refill takes less than 2 seconds.      Coloration: Skin is not jaundiced.      Findings: No rash.   Neurological:      General: No focal deficit present.      Mental Status: He is alert.      Sensory: Sensory deficit present.      Motor: No weakness.   Psychiatric:         Mood and Affect: Mood normal.         Behavior: Behavior normal.       Lab Results   Component Value Date    WBC 11.1 (H) 02/26/2025    HGB 18.2 (H) 02/26/2025    HCT 56.5 (H) 02/26/2025    MCV 90.8 02/26/2025     02/26/2025     Lab Results   Component Value Date    CHOL 173 02/26/2025    CHOL 224 (H) 07/21/2023    CHOL 219 (H) 07/22/2022     Lab Results   Component Value Date    HDL 37 (L) 02/26/2025    HDL 35.9 (A) 07/21/2023    HDL 34.9 (A) 07/22/2022     Lab Results   Component Value Date    LDLCALC 99 02/26/2025     Lab Results   Component Value Date    TRIG 241 (H) 02/26/2025    TRIG 213 (H) 07/21/2023    TRIG 159 (H) 11/23/2020     No components found for: \"CHOLHDL\"  Lab Results   Component Value Date    HGBA1C 4.9 02/26/2025       Assessment/Plan   Problem List Items Addressed This Visit       Kwadwo's joint of " right ankle    Overview     S/P reconstructive surgery/fusion -- 8/15/23 -- DPM Dr. Lui.            Current Assessment & Plan     Continue follow up with podiatry.          Right toe amputee (Multi)    Overview     Never needed to get fit for carbon fiber AFO.   Just getting shoe inserts.            Major depression in remission (CMS-HCC)    Overview      Associated with anxiety triggered by acute illness   Successfully weaned off cymbalta.          Current Assessment & Plan     Continues ongoing counseling.    Encouraged to give a try to red light or any FDA approved therapy.          Well adult exam - Primary    Overview     3/14/25 Preventive exam performed           Current Assessment & Plan     Recommend Prevnar-20 x 1 for chronic diabetes.     Annual PSA testing  Due for Colonoscopy at age 45  Shingles vaccine at age 50    # Hx Morbid obesity -- now down to class 1 (BMI >30) -- successfully Significant weight loss with skin folds on low back and abdomen. Weight loss tips provided 7/22/22. Encourage keeping dry -- changing out of sweaty undershirts, powder to keep dry, prevent chaffing.     # Hx Right foot cellulitis, s/p 4th/5th toe and MT amputation, resolved osteomyelitis.   # Hx Right heel wounds --s/p 4th skin graft per Dr MERRITT.   # hx shingles -- resolved.          Diabetes mellitus (Multi)    Overview     With Carcot foot -- s/p fusion right foot/ankle 2023.           Current Assessment & Plan     A1c normalized with weight loss and exercise regimen.          Hypogonadism male    Current Assessment & Plan     monitor PSA/CBC normal 2/2025.    Tolerating 150 mg q1 week.     Recheck TEST/PSA/CBC in 3-4 months.   Testosterone agreement signed 1/2024, reviewed 3/2025          Other Visit Diagnoses       Colon cancer screening        Relevant Orders    Colonoscopy Screening; Average Risk Patient

## 2025-03-14 NOTE — ASSESSMENT & PLAN NOTE
monitor PSA/CBC normal 2/2025.    Tolerating 150 mg q1 week.     Recheck TEST/PSA/CBC in 3-4 months.   Testosterone agreement signed 1/2024, reviewed 3/2025

## 2025-03-14 NOTE — ASSESSMENT & PLAN NOTE
Recommend Prevnar-20 x 1 for chronic diabetes.     Annual PSA testing  Due for Colonoscopy at age 45  Shingles vaccine at age 50    # Hx Morbid obesity -- now down to class 1 (BMI >30) -- successfully Significant weight loss with skin folds on low back and abdomen. Weight loss tips provided 7/22/22. Encourage keeping dry -- changing out of sweaty undershirts, powder to keep dry, prevent chaffing.     # Hx Right foot cellulitis, s/p 4th/5th toe and MT amputation, resolved osteomyelitis.   # Hx Right heel wounds --s/p 4th skin graft per Dr MERRITT.   # hx shingles -- resolved.

## 2025-03-14 NOTE — ASSESSMENT & PLAN NOTE
Continues ongoing counseling.    Encouraged to give a try to red light or any FDA approved therapy.

## 2025-04-03 DIAGNOSIS — K29.70 GASTRITIS WITHOUT BLEEDING, UNSPECIFIED CHRONICITY, UNSPECIFIED GASTRITIS TYPE: ICD-10-CM

## 2025-04-03 RX ORDER — FAMOTIDINE 20 MG/1
40 TABLET, FILM COATED ORAL NIGHTLY
Qty: 90 TABLET | Refills: 11 | Status: SHIPPED | OUTPATIENT
Start: 2025-04-03

## 2025-09-17 ENCOUNTER — APPOINTMENT (OUTPATIENT)
Dept: PRIMARY CARE | Facility: CLINIC | Age: 45
End: 2025-09-17
Payer: COMMERCIAL

## 2026-03-17 ENCOUNTER — APPOINTMENT (OUTPATIENT)
Dept: PRIMARY CARE | Facility: CLINIC | Age: 46
End: 2026-03-17
Payer: COMMERCIAL

## (undated) DEVICE — Device

## (undated) DEVICE — NEEDLE, HYPODERMIC, NEEDLE PRO, 25G X 1.5, ORANGE

## (undated) DEVICE — SUTURE, VICRYL, 3-0, 27 IN, SH

## (undated) DEVICE — APPLICATOR, CHLORAPREP, W/ORANGE TINT, 26ML

## (undated) DEVICE — DRAPE, C-ARM IMAGE

## (undated) DEVICE — SPONGE, GAUZE, AVANT, STERILE, NONWOVEN, 4PLY, 4 X 4, STANDARD

## (undated) DEVICE — BANDAGE, ESMARK, 6 IN X 12 FT

## (undated) DEVICE — SUTURE, MONOCRYL, 4-0, 27 IN, PS-2, UNDYED

## (undated) DEVICE — SUTURE, VICRYL 4-0, TAPER POINT, SH VIOLET 27 INCH

## (undated) DEVICE — SYRINGE, 10 CC, LUER LOCK

## (undated) DEVICE — SUTURE, VICRYL, 2-0, 27 IN, SH, UNDYED